# Patient Record
Sex: FEMALE | Race: WHITE | NOT HISPANIC OR LATINO | Employment: FULL TIME | URBAN - METROPOLITAN AREA
[De-identification: names, ages, dates, MRNs, and addresses within clinical notes are randomized per-mention and may not be internally consistent; named-entity substitution may affect disease eponyms.]

---

## 2017-04-20 ENCOUNTER — TELEPHONE (OUTPATIENT)
Dept: FAMILY MEDICINE | Facility: CLINIC | Age: 32
End: 2017-04-20

## 2017-04-20 NOTE — TELEPHONE ENCOUNTER
Panel Management Review      Patient has the following on her problem list: None      Composite cancer screening  Chart review shows that this patient is due/due soon for the following Pap Smear  Summary:    Patient is due/failing the following:   PAP    Action needed:   Patient needs office visit for pap.    Type of outreach:    Phone, spoke to patient.  agreed to schedule until she gets health insurance    Questions for provider review:    None                                                                                                                                    Yeny Chandra MA       Chart routed to Care Team .

## 2017-09-30 ENCOUNTER — OFFICE VISIT (OUTPATIENT)
Dept: URGENT CARE | Facility: URGENT CARE | Age: 32
End: 2017-09-30
Payer: MEDICAID

## 2017-09-30 VITALS
SYSTOLIC BLOOD PRESSURE: 115 MMHG | HEART RATE: 87 BPM | RESPIRATION RATE: 20 BRPM | DIASTOLIC BLOOD PRESSURE: 69 MMHG | TEMPERATURE: 98 F

## 2017-09-30 DIAGNOSIS — L03.012 CELLULITIS OF FINGER OF LEFT HAND: Primary | ICD-10-CM

## 2017-09-30 DIAGNOSIS — T63.301A SPIDER BITE, ACCIDENTAL OR UNINTENTIONAL, INITIAL ENCOUNTER: ICD-10-CM

## 2017-09-30 DIAGNOSIS — B37.31 YEAST INFECTION OF THE VAGINA: ICD-10-CM

## 2017-09-30 PROCEDURE — 87070 CULTURE OTHR SPECIMN AEROBIC: CPT | Performed by: NURSE PRACTITIONER

## 2017-09-30 PROCEDURE — 99214 OFFICE O/P EST MOD 30 MIN: CPT | Performed by: NURSE PRACTITIONER

## 2017-09-30 RX ORDER — DOXYCYCLINE 100 MG/1
100 CAPSULE ORAL 2 TIMES DAILY
Qty: 20 CAPSULE | Refills: 0 | Status: SHIPPED | OUTPATIENT
Start: 2017-09-30 | End: 2019-10-18

## 2017-09-30 RX ORDER — TRAMADOL HYDROCHLORIDE 50 MG/1
50 TABLET ORAL EVERY 6 HOURS PRN
Qty: 8 TABLET | Refills: 0 | Status: SHIPPED | OUTPATIENT
Start: 2017-09-30 | End: 2019-10-18

## 2017-09-30 RX ORDER — FLUCONAZOLE 150 MG/1
150 TABLET ORAL
Qty: 2 TABLET | Refills: 0 | Status: SHIPPED | OUTPATIENT
Start: 2017-09-30 | End: 2019-10-18

## 2017-09-30 NOTE — MR AVS SNAPSHOT
After Visit Summary   9/30/2017    Amisha Brown    MRN: [id number]           Patient Information     Date Of Birth          1985        Visit Information        Provider Department      9/30/2017 1:55 PM Ekta Fernandez APRN [facility]        Today's Diagnoses     Cellulitis of finger of left hand    -  1    Spider bite, accidental or unintentional, initial encounter          Care Instructions    Use medications as directed  Culture was sent today.  Warm heat to area. Elevate area.    Tylenol/Ibuprofen OTC as discussed for pain as long as no contraindications or allergies.     Return to clinic or follow up with PCP for recheck in 2-3  days.  To ER if any worsening symptoms at any time, you note the redness expanding outside the margins, red streaking, or fevers.     Patient voiced understanding of instructions given.     Discharge Instructions for Cellulitis  You have been diagnosed with cellulitis. This is an infection in the deepest layer of the skin. In some cases, the infection also affects the muscle. Cellulitis is caused by bacteria. The bacteria can enter the body through broken skin. This can happen with a cut, scratch, animal bite, or an insect bite that has been scratched. You may have been treated in the hospital with antibiotics and fluids. You will likely be given a prescription for antibiotics to take at home. This sheet will help you take care of yourself at home.  Home care  When you are home:    Take the prescribed antibiotic medicine you are given as directed until it is gone. Take it even if you feel better. It treats the infection and stops it from returning. Not taking all the medicine can make future infections hard to treat.    Keep the infected area clean.    When possible, raise the infected area above the level of your heart. This helps keep swelling down.    Talk with your healthcare provider if you are in pain. Ask what kind of  over-the-counter medicine you can take for pain.    Apply clean bandages as advised.    Take your temperature once a day for a week.    Wash your hands often to prevent spreading the infection.  In the future, wash your hands before and after you touch cuts, scratches, or bandages. This will help prevent infection.   When to call your healthcare provider  Call your healthcare provider immediately if you have any of the following:    Difficulty or pain when moving the joints above or below the infected area    Discharge or pus draining from the area    Fever of 100.4 F (38 C) or higher, or as directed by your healthcare provider    Pain that gets worse in or around the infected     Redness that gets worse in or around the infected area, particularly if the area of redness expands to a wider area    Shaking chills    Swelling of the infected area    Vomiting   Date Last Reviewed: 8/1/2016 2000-2017 [employer]. [address] [place] [address]. All rights reserved. This information is not intended as a substitute for professional medical care. Always follow your healthcare professional's instructions.        Cellulitis  Cellulitis is an infection of the deep layers of skin. A break in the skin, such as a cut or scratch, can let bacteria under the skin. If the bacteria get to deep layers of the skin, it can be serious. If not treated, cellulitis can get into the bloodstream and lymph nodes. The infection can then spread throughout the body. This causes serious illness.  Cellulitis causes the affected skin to become red, swollen, warm, and sore. The reddened areas have a visible border. An open sore may leak fluid (pus). You may have a fever, chills, and pain.  Cellulitis is treated with antibiotics taken for 7 to 10 days. An open sore may be cleaned and covered with cool wet gauze. Symptoms should get better 1 to 2 days after treatment is started. Make sure to take all the antibiotics for the  full number of days until they are gone. Keep taking the medicine even if your symptoms go away.  Home care  Follow these tips:    Limit the use of the part of your body with cellulitis.     If the infection is on your leg, keep your leg raised while sitting. This will help to reduce swelling.    Take all of the antibiotic medicine exactly as directed until it is gone. Do not miss any doses, especially during the first 7 days. Don t stop taking the medicine when your symptoms get better.    Keep the affected area clean and dry.    Wash your hands with soap and warm water before and after touching your skin. Anyone else who touches your skin should also wash his or her hands. Don't share towels.  Follow-up care  Follow up with your healthcare provider, or as advised. If your infection does not go away on the first antibiotic, your healthcare provider will prescribe a different one.  When to seek medical advice  Call your healthcare provider right away if any of these occur:    Red areas that spread    Swelling or pain that gets worse    Fluid leaking from the skin (pus)    Fever higher of 100.4  F (38.0  C) or higher after 2 days on antibiotics  Date Last Reviewed: 9/1/2016 2000-2017 [employer]. [address] [place] [address]. All rights reserved. This information is not intended as a substitute for professional medical care. Always follow your healthcare professional's instructions.        Brown Recluse Spider Bite    Most spiders are harmless to people. But there are 2 spiders in the [place] that can cause harm: the black  and the brown recluse.  Brown recluse spiders may be light or dark brown. They are   inch to   inch long, and have long, thin, legs. Some have a fiddle-shaped corazon on their back. They are most often found in the South, [place], and [place]. They live in places such as closets, attics, basements, porches, janette, and woodpiles.  A brown recluse spider bite may be  painless at first or may burn slightly like a bee sting. A small red corazon may form at the site. Then small blisters may appear. Within 2 to 8 hours, the area may become painful, swollen, and itchy. Other symptoms can include a general feeling of discomfort (malaise), fever, joint pain, nausea, and vomiting. In most cases, the bite site heals on its own within a week. But in some cases, the skin at the bite site may break down. (For instance, the skin may turn black and sink down as the tissue below dies.) This can cause an open wound called an ulcer to form. It may take a few weeks to a few months to heal. In rare cases, a brown recluse spider bite leads to more serious problems. These can include muscle damage, kidney failure, problems with blood clotting, and coma.  The bite site will be cleaned. Medicines may be given to relieve pain, if needed. Antibiotics may also be given to treat infection. A tetanus shot may be given. If an ulcer forms, skin grafting may be needed later to repair severe damage to the skin. But most bites heal without scarring.  Home care    You may use acetaminophen or ibuprofen to relieve pain, unless another pain medicine was prescribed. Note: Talk with your healthcare provider before using these medicines if you have chronic liver or kidney disease or ever had a stomach ulcer or GI bleeding.    If you were prescribed antibiotics, be sure to take them exactly as directed. Also be sure to complete the medicines.    Care for the bite site as directed by your healthcare provider. This may involve cleaning the wound at least once a day with soap and water.    To help ease pain and swelling, apply cold packs to the bite site as directed. You can use a cool wet washcloth. Or you can make a cold pack by filling a plastic bag that seals at the top with ice cubes and wrapping it with a thin towel. Do not apply ice directly on the skin.    If you were bitten on the arm or leg, keep the body part  "raised (elevated). Swelling may worsen in the down position.    Check the bite site daily for signs of infection (see below).  Follow-up care  Follow up with your healthcare provider, or as advised.  When to seek medical advice  Call your healthcare provider right away if any of these occur:    Fever of 100.4 F (38 C) or higher, or as directed by your provider    Pain worsens and is not relieved with medicine    Signs of infection at the bite site such as increased redness or streaking, swelling, or foul-smelling drainage    Bite site becomes black or blue    Bite site won t heal or grows larger  Date Last Reviewed: 3/1/2017    9445-9205 [employer]. [address] [place] [address], [place] [address]. All rights reserved. This information is not intended as a substitute for professional medical care. Always follow your healthcare professional's instructions.                Follow-ups after your visit        Who to contact     If you have questions or need follow up information about today's clinic visit or your schedule please contact [facility] directly at [phone].  Normal or non-critical lab and imaging results will be communicated to you by MD-IThart, letter or phone within 4 business days after the clinic has received the results. If you do not hear from us within 7 days, please contact the clinic through [employer] or phone. If you have a critical or abnormal lab result, we will notify you by phone as soon as possible.  Submit refill requests through [facility] or call your pharmacy and they will forward the refill request to us. Please allow 3 business days for your refill to be completed.          Additional Information About Your Visit        MD-ITharHepa Wash Information     [facility] lets you send messages to your doctor, view your test results, renew your prescriptions, schedule appointments and more. To sign up, go to [email or web address] . Click on \"Log in\" on the left side of " "the screen, which will take you to the Welcome page. Then click on \"Sign up Now\" on the right side of the page.     You will be asked to enter the access code listed below, as well as some personal information. Please follow the directions to create your username and password.     Your access code is: [email or web address]  Expires: 2017  2:39 PM     Your access code will  in 90 days. If you need help or a new code, please call your [facility] clinic or [phone].        Care EveryWhere ID     This is your Care EveryWhere ID. This could be used by other organizations to access your [facility] medical records  [id number]        Your Vitals Were     Pulse Temperature Respirations             87 98  F (36.7  C) (Tympanic) 20          Blood Pressure from Last 3 Encounters:   17 115/69   16 113/74   16 121/76    Weight from Last 3 Encounters:   16 188 lb (85.3 kg)   16 188 lb (85.3 kg)   09 170 lb (77.1 kg)              We Performed the Following     Wound Culture Aerobic Bacterial          Today's Medication Changes          These changes are accurate as of: 17  2:39 PM.  If you have any questions, ask your nurse or doctor.               Start taking these medicines.        Dose/Directions    doxycycline 100 MG capsule   Commonly known as:  VIBRAMYCIN   Used for:  Spider bite, accidental or unintentional, initial encounter, Cellulitis of finger of left hand   Started by:  Ekta Fernandez APRN CNP        Dose:  100 mg   Take 1 capsule (100 mg) by mouth 2 times daily   Quantity:  20 capsule   Refills:  0       traMADol 50 MG tablet   Commonly known as:  ULTRAM   Used for:  Spider bite, accidental or unintentional, initial encounter   Started by:  Ekta Fernandez APRN CNP        Dose:  50 mg   Take 1 tablet (50 mg) by mouth every 6 hours as needed for pain maximum 4 tablet(s) per day   Quantity:  8 tablet   Refills:  0            Where to get your medicines      [facility] " [facility] [facility] [place] [place] [facility] [address]  [address] [place] [address]    Hours:  [facility] [place] [facility] [employer] Phone:  [phone]     doxycycline 100 MG capsule         Some of these will need a paper prescription and others can be bought over the counter.  Ask your nurse if you have questions.     Bring a paper prescription for each of these medications     traMADol 50 MG tablet                Primary Care Provider Office Phone # Fax #    Sara Chase [phone]       [facility] [address] [facility] [address]        Equal Access to Services     [facility]: Hadii amrik Stephens, wakyle harrell, qaybta kaalmada jp, amarilys martinez. So [facility] [phone].    ATENCIÓN: Si habla español, tiene a bethea disposición servicios gratuitos de asistencia lingüística. [facility] [phone].    We comply with applicable federal civil rights laws and [place] laws. We do not discriminate on the basis of race, color, national origin, age, disability, sex, sexual orientation, or gender identity.            Thank you!     Thank you for choosing [facility]  for your care. Our goal is always to provide you with excellent care. Hearing back from our patients is one way we can continue to improve our services. Please take a few minutes to complete the written survey that you may receive in the mail after your visit with us. Thank you!             Your Updated Medication List - Protect others around you: Learn how to safely use, store and throw away your medicines at www.disposemymeds.org.          This list is accurate as of: 9/30/17  2:39 PM.  Always use your most recent med list.                   Brand Name Dispense Instructions for use Diagnosis    cyclobenzaprine 10 MG tablet    FLEXERIL    40 tablet    Take 1 tablet (10 mg) by mouth 3 times daily as needed for  muscle spasms    Sprain of neck, initial encounter       doxycycline 100 MG capsule    VIBRAMYCIN    20 capsule    Take 1 capsule (100 mg) by mouth 2 times daily    Spider bite, accidental or unintentional, initial encounter, Cellulitis of finger of left hand       HYDROcodone-acetaminophen 5-325 MG per tablet    NORCO    40 tablet    1-2 tabs Q 4 hours prn    Sprain of neck, initial encounter       IBUPROFEN PO      Take 200 mg by mouth every 4 hours as needed for moderate pain (pt taking 3 tabs every 3-4 hours)        predniSONE 20 MG tablet    DELTASONE    14 tablet    2 tabs QDAY x 7 days    Sprain of neck, initial encounter       traMADol 50 MG tablet    ULTRAM    8 tablet    Take 1 tablet (50 mg) by mouth every 6 hours as needed for pain maximum 4 tablet(s) per day    Spider bite, accidental or unintentional, initial encounter

## 2017-09-30 NOTE — PROGRESS NOTES
SUBJECTIVE:  Amisha Brown is a 32 year old female who presents to the clinic today for possible cellulitis on the left index finger.  Patient began noticing edema, skin erythema (reddened skin) and tenderness about 3  days ago. Denies fevers, chills, or night sweats.    Possible event related to current symptoms: bug bite  Last tetanus booster within 5 years: yes      History reviewed. No pertinent past medical history.     Current Outpatient Prescriptions   Medication Sig Dispense Refill     IBUPROFEN PO Take 200 mg by mouth every 4 hours as needed for moderate pain (pt taking 3 tabs every 3-4 hours)        HYDROcodone-acetaminophen (NORCO) 5-325 MG per tablet 1-2 tabs Q 4 hours prn (Patient not taking: Reported on 9/30/2017) 40 tablet 0     predniSONE (DELTASONE) 20 MG tablet 2 tabs QDAY x 7 days (Patient not taking: Reported on 9/30/2017) 14 tablet 0     cyclobenzaprine (FLEXERIL) 10 MG tablet Take 1 tablet (10 mg) by mouth 3 times daily as needed for muscle spasms (Patient not taking: Reported on 9/30/2017) 40 tablet 3       REVIEW OF SYSTEMS  ROS:   CONSTITUTIONAL:NEGATIVE for fever, chills, change in weight  INTEGUMENTARY/SKIN: See above   EYES: NEGATIVE for vision changes or irritation  ENT/MOUTH: NEGATIVE for ear, mouth and throat problems  RESP:NEGATIVE for significant cough or SOB  CV: NEGATIVE for chest pain, palpitations or peripheral edema  MUSCULOSKELETAL: NEGATIVE for significant arthralgias or myalgia  NEURO: NEGATIVE for weakness, dizziness or paresthesias    OBJECTIVE:  /69 (BP Location: Right arm, Cuff Size: Adult Regular)  Pulse 87  Temp 98  F (36.7  C) (Tympanic)  Resp 20   General appearance: alert,no apparent distress  CARDIAC:NORMAL - regular rate and rhythm without murmur.  RESP: Normal - CTA without rales, rhonchi, or wheezing.  ABDOMEN: Abdomen soft, non-tender. BS normal. No masses, organomegaly  SKIN:  Spider bite to left index finer with abrasion noted with 3 inches by 2  inches of redness        ASSESSMENT:    ICD-10-CM    1. Cellulitis of finger of left hand L03.012 Wound Culture Aerobic Bacterial     doxycycline (VIBRAMYCIN) 100 MG capsule   2. Spider bite, accidental or unintentional, initial encounter T63.301A doxycycline (VIBRAMYCIN) 100 MG capsule     traMADol (ULTRAM) 50 MG tablet   3. Yeast infection of the vagina B37.3 fluconazole (DIFLUCAN) 150 MG tablet           PLAN:  Patient Instructions     Use medications as directed  Culture was sent today.  Warm heat to area. Elevate area.    Tylenol/Ibuprofen OTC as discussed for pain as long as no contraindications or allergies.     Return to clinic or follow up with PCP for recheck in 2-3  days.  To ER if any worsening symptoms at any time, you note the redness expanding outside the margins, red streaking, or fevers.     Patient voiced understanding of instructions given.     Discharge Instructions for Cellulitis  You have been diagnosed with cellulitis. This is an infection in the deepest layer of the skin. In some cases, the infection also affects the muscle. Cellulitis is caused by bacteria. The bacteria can enter the body through broken skin. This can happen with a cut, scratch, animal bite, or an insect bite that has been scratched. You may have been treated in the hospital with antibiotics and fluids. You will likely be given a prescription for antibiotics to take at home. This sheet will help you take care of yourself at home.  Home care  When you are home:    Take the prescribed antibiotic medicine you are given as directed until it is gone. Take it even if you feel better. It treats the infection and stops it from returning. Not taking all the medicine can make future infections hard to treat.    Keep the infected area clean.    When possible, raise the infected area above the level of your heart. This helps keep swelling down.    Talk with your healthcare provider if you are in pain. Ask what kind of over-the-counter  medicine you can take for pain.    Apply clean bandages as advised.    Take your temperature once a day for a week.    Wash your hands often to prevent spreading the infection.  In the future, wash your hands before and after you touch cuts, scratches, or bandages. This will help prevent infection.   When to call your healthcare provider  Call your healthcare provider immediately if you have any of the following:    Difficulty or pain when moving the joints above or below the infected area    Discharge or pus draining from the area    Fever of 100.4 F (38 C) or higher, or as directed by your healthcare provider    Pain that gets worse in or around the infected     Redness that gets worse in or around the infected area, particularly if the area of redness expands to a wider area    Shaking chills    Swelling of the infected area    Vomiting   Date Last Reviewed: 8/1/2016 2000-2017 [employer]. [address] [place] [address]. All rights reserved. This information is not intended as a substitute for professional medical care. Always follow your healthcare professional's instructions.        Cellulitis  Cellulitis is an infection of the deep layers of skin. A break in the skin, such as a cut or scratch, can let bacteria under the skin. If the bacteria get to deep layers of the skin, it can be serious. If not treated, cellulitis can get into the bloodstream and lymph nodes. The infection can then spread throughout the body. This causes serious illness.  Cellulitis causes the affected skin to become red, swollen, warm, and sore. The reddened areas have a visible border. An open sore may leak fluid (pus). You may have a fever, chills, and pain.  Cellulitis is treated with antibiotics taken for 7 to 10 days. An open sore may be cleaned and covered with cool wet gauze. Symptoms should get better 1 to 2 days after treatment is started. Make sure to take all the antibiotics for the full number of  days until they are gone. Keep taking the medicine even if your symptoms go away.  Home care  Follow these tips:    Limit the use of the part of your body with cellulitis.     If the infection is on your leg, keep your leg raised while sitting. This will help to reduce swelling.    Take all of the antibiotic medicine exactly as directed until it is gone. Do not miss any doses, especially during the first 7 days. Don t stop taking the medicine when your symptoms get better.    Keep the affected area clean and dry.    Wash your hands with soap and warm water before and after touching your skin. Anyone else who touches your skin should also wash his or her hands. Don't share towels.  Follow-up care  Follow up with your healthcare provider, or as advised. If your infection does not go away on the first antibiotic, your healthcare provider will prescribe a different one.  When to seek medical advice  Call your healthcare provider right away if any of these occur:    Red areas that spread    Swelling or pain that gets worse    Fluid leaking from the skin (pus)    Fever higher of 100.4  F (38.0  C) or higher after 2 days on antibiotics  Date Last Reviewed: 9/1/2016 2000-2017 [employer]. [address] [place] [address]. All rights reserved. This information is not intended as a substitute for professional medical care. Always follow your healthcare professional's instructions.        Brown Recluse Spider Bite    Most spiders are harmless to people. But there are 2 spiders in the [place] that can cause harm: the black  and the brown recluse.  Brown recluse spiders may be light or dark brown. They are   inch to   inch long, and have long, thin, legs. Some have a fiddle-shaped corazon on their back. They are most often found in the [place], [place], and [place]. They live in places such as closets, attics, basements, porches, janette, and woodpiles.  A brown recluse spider bite may be painless at first or  may burn slightly like a bee sting. A small red corazon may form at the site. Then small blisters may appear. Within 2 to 8 hours, the area may become painful, swollen, and itchy. Other symptoms can include a general feeling of discomfort (malaise), fever, joint pain, nausea, and vomiting. In most cases, the bite site heals on its own within a week. But in some cases, the skin at the bite site may break down. (For instance, the skin may turn black and sink down as the tissue below dies.) This can cause an open wound called an ulcer to form. It may take a few weeks to a few months to heal. In rare cases, a brown recluse spider bite leads to more serious problems. These can include muscle damage, kidney failure, problems with blood clotting, and coma.  The bite site will be cleaned. Medicines may be given to relieve pain, if needed. Antibiotics may also be given to treat infection. A tetanus shot may be given. If an ulcer forms, skin grafting may be needed later to repair severe damage to the skin. But most bites heal without scarring.  Home care    You may use acetaminophen or ibuprofen to relieve pain, unless another pain medicine was prescribed. Note: Talk with your healthcare provider before using these medicines if you have chronic liver or kidney disease or ever had a stomach ulcer or GI bleeding.    If you were prescribed antibiotics, be sure to take them exactly as directed. Also be sure to complete the medicines.    Care for the bite site as directed by your healthcare provider. This may involve cleaning the wound at least once a day with soap and water.    To help ease pain and swelling, apply cold packs to the bite site as directed. You can use a cool wet washcloth. Or you can make a cold pack by filling a plastic bag that seals at the top with ice cubes and wrapping it with a thin towel. Do not apply ice directly on the skin.    If you were bitten on the arm or leg, keep the body part raised (elevated).  Swelling may worsen in the down position.    Check the bite site daily for signs of infection (see below).  Follow-up care  Follow up with your healthcare provider, or as advised.  When to seek medical advice  Call your healthcare provider right away if any of these occur:    Fever of 100.4 F (38 C) or higher, or as directed by your provider    Pain worsens and is not relieved with medicine    Signs of infection at the bite site such as increased redness or streaking, swelling, or foul-smelling drainage    Bite site becomes black or blue    Bite site won t heal or grows larger  Date Last Reviewed: 3/1/2017    3940-2825 [employer]. [address] [place] [address], [place] [address]. All rights reserved. This information is not intended as a substitute for professional medical care. Always follow your healthcare professional's instructions.            ALEENA Bueno CNP

## 2017-09-30 NOTE — NURSING NOTE
"Chief Complaint   Patient presents with     Insect Bites     Left ring finger. Thinks its a spider bite. Noticed Wed. has been documenting.  Line this morning drawn and getting worse. Painful.  can feel going up arm.  Ibuprofen and cream not helping       Initial /69 (BP Location: Right arm, Cuff Size: Adult Regular)  Pulse 87  Temp 98  F (36.7  C) (Tympanic)  Resp 20 Estimated body mass index is 32.27 kg/(m^2) as calculated from the following:    Height as of 12/28/16: 5' 4\" (1.626 m).    Weight as of 12/28/16: 188 lb (85.3 kg).  Medication Reconciliation: complete    Health Maintenance that is potentially due pending provider review:  NONE    n/a    Is there anyone who you would like to be able to receive your results? .  If yes have patient fill out JOYCE Peraza M.A.        "

## 2017-09-30 NOTE — PATIENT INSTRUCTIONS
Use medications as directed  Culture was sent today.  Warm heat to area. Elevate area.    Tylenol/Ibuprofen OTC as discussed for pain as long as no contraindications or allergies.     Return to clinic or follow up with PCP for recheck in 2-3  days.  To ER if any worsening symptoms at any time, you note the redness expanding outside the margins, red streaking, or fevers.     Patient voiced understanding of instructions given.     Discharge Instructions for Cellulitis  You have been diagnosed with cellulitis. This is an infection in the deepest layer of the skin. In some cases, the infection also affects the muscle. Cellulitis is caused by bacteria. The bacteria can enter the body through broken skin. This can happen with a cut, scratch, animal bite, or an insect bite that has been scratched. You may have been treated in the hospital with antibiotics and fluids. You will likely be given a prescription for antibiotics to take at home. This sheet will help you take care of yourself at home.  Home care  When you are home:    Take the prescribed antibiotic medicine you are given as directed until it is gone. Take it even if you feel better. It treats the infection and stops it from returning. Not taking all the medicine can make future infections hard to treat.    Keep the infected area clean.    When possible, raise the infected area above the level of your heart. This helps keep swelling down.    Talk with your healthcare provider if you are in pain. Ask what kind of over-the-counter medicine you can take for pain.    Apply clean bandages as advised.    Take your temperature once a day for a week.    Wash your hands often to prevent spreading the infection.  In the future, wash your hands before and after you touch cuts, scratches, or bandages. This will help prevent infection.   When to call your healthcare provider  Call your healthcare provider immediately if you have any of the following:    Difficulty or pain when  moving the joints above or below the infected area    Discharge or pus draining from the area    Fever of 100.4 F (38 C) or higher, or as directed by your healthcare provider    Pain that gets worse in or around the infected     Redness that gets worse in or around the infected area, particularly if the area of redness expands to a wider area    Shaking chills    Swelling of the infected area    Vomiting   Date Last Reviewed: 8/1/2016 2000-2017 [employer]. [address] [place] [address], [place] [address]. All rights reserved. This information is not intended as a substitute for professional medical care. Always follow your healthcare professional's instructions.        Cellulitis  Cellulitis is an infection of the deep layers of skin. A break in the skin, such as a cut or scratch, can let bacteria under the skin. If the bacteria get to deep layers of the skin, it can be serious. If not treated, cellulitis can get into the bloodstream and lymph nodes. The infection can then spread throughout the body. This causes serious illness.  Cellulitis causes the affected skin to become red, swollen, warm, and sore. The reddened areas have a visible border. An open sore may leak fluid (pus). You may have a fever, chills, and pain.  Cellulitis is treated with antibiotics taken for 7 to 10 days. An open sore may be cleaned and covered with cool wet gauze. Symptoms should get better 1 to 2 days after treatment is started. Make sure to take all the antibiotics for the full number of days until they are gone. Keep taking the medicine even if your symptoms go away.  Home care  Follow these tips:    Limit the use of the part of your body with cellulitis.     If the infection is on your leg, keep your leg raised while sitting. This will help to reduce swelling.    Take all of the antibiotic medicine exactly as directed until it is gone. Do not miss any doses, especially during the first 7 days. Don t stop taking the medicine  when your symptoms get better.    Keep the affected area clean and dry.    Wash your hands with soap and warm water before and after touching your skin. Anyone else who touches your skin should also wash his or her hands. Don't share towels.  Follow-up care  Follow up with your healthcare provider, or as advised. If your infection does not go away on the first antibiotic, your healthcare provider will prescribe a different one.  When to seek medical advice  Call your healthcare provider right away if any of these occur:    Red areas that spread    Swelling or pain that gets worse    Fluid leaking from the skin (pus)    Fever higher of 100.4  F (38.0  C) or higher after 2 days on antibiotics  Date Last Reviewed: 9/1/2016 2000-2017 [employer]. [address] [place] [address], [place] [address]. All rights reserved. This information is not intended as a substitute for professional medical care. Always follow your healthcare professional's instructions.        Brown Recluse Spider Bite    Most spiders are harmless to people. But there are 2 spiders in the [place] that can cause harm: the black  and the brown recluse.  Brown recluse spiders may be light or dark brown. They are   inch to   inch long, and have long, thin, legs. Some have a fiddle-shaped corazon on their back. They are most often found in the [place], [place], and [place]. They live in places such as closets, attics, basements, porches, janette, and woodpiles.  A brown recluse spider bite may be painless at first or may burn slightly like a bee sting. A small red corazon may form at the site. Then small blisters may appear. Within 2 to 8 hours, the area may become painful, swollen, and itchy. Other symptoms can include a general feeling of discomfort (malaise), fever, joint pain, nausea, and vomiting. In most cases, the bite site heals on its own within a week. But in some cases, the skin at the bite site may break down. (For instance, the skin may turn black  and sink down as the tissue below dies.) This can cause an open wound called an ulcer to form. It may take a few weeks to a few months to heal. In rare cases, a brown recluse spider bite leads to more serious problems. These can include muscle damage, kidney failure, problems with blood clotting, and coma.  The bite site will be cleaned. Medicines may be given to relieve pain, if needed. Antibiotics may also be given to treat infection. A tetanus shot may be given. If an ulcer forms, skin grafting may be needed later to repair severe damage to the skin. But most bites heal without scarring.  Home care    You may use acetaminophen or ibuprofen to relieve pain, unless another pain medicine was prescribed. Note: Talk with your healthcare provider before using these medicines if you have chronic liver or kidney disease or ever had a stomach ulcer or GI bleeding.    If you were prescribed antibiotics, be sure to take them exactly as directed. Also be sure to complete the medicines.    Care for the bite site as directed by your healthcare provider. This may involve cleaning the wound at least once a day with soap and water.    To help ease pain and swelling, apply cold packs to the bite site as directed. You can use a cool wet washcloth. Or you can make a cold pack by filling a plastic bag that seals at the top with ice cubes and wrapping it with a thin towel. Do not apply ice directly on the skin.    If you were bitten on the arm or leg, keep the body part raised (elevated). Swelling may worsen in the down position.    Check the bite site daily for signs of infection (see below).  Follow-up care  Follow up with your healthcare provider, or as advised.  When to seek medical advice  Call your healthcare provider right away if any of these occur:    Fever of 100.4 F (38 C) or higher, or as directed by your provider    Pain worsens and is not relieved with medicine    Signs of infection at the bite site such as increased  redness or streaking, swelling, or foul-smelling drainage    Bite site becomes black or blue    Bite site won t heal or grows larger  Date Last Reviewed: 3/1/2017    2359-9387 [employer]. [address] [place] [address], [place] [address]. All rights reserved. This information is not intended as a substitute for professional medical care. Always follow your healthcare professional's instructions.

## 2017-10-02 LAB
BACTERIA SPEC CULT: NO GROWTH
Lab: NORMAL
SPECIMEN SOURCE: NORMAL

## 2017-11-10 ENCOUNTER — TRANSFERRED RECORDS (OUTPATIENT)
Dept: HEALTH INFORMATION MANAGEMENT | Facility: CLINIC | Age: 32
End: 2017-11-10

## 2017-11-10 LAB
C TRACH DNA SPEC QL PROBE+SIG AMP: NEGATIVE
N GONORRHOEA DNA SPEC QL PROBE+SIG AMP: NEGATIVE
PAP-ABSTRACT: NORMAL
SPECIMEN DESCRIP: NORMAL
SPECIMEN DESCRIPTION: NORMAL

## 2018-03-12 ENCOUNTER — HOSPITAL ENCOUNTER (EMERGENCY)
Facility: CLINIC | Age: 33
Discharge: HOME | End: 2018-03-12
Attending: EMERGENCY MEDICINE | Admitting: EMERGENCY MEDICINE
Payer: COMMERCIAL

## 2018-03-12 VITALS
HEIGHT: 64 IN | WEIGHT: 175 LBS | BODY MASS INDEX: 29.88 KG/M2 | RESPIRATION RATE: 16 BRPM | OXYGEN SATURATION: 98 % | TEMPERATURE: 98.4 F | DIASTOLIC BLOOD PRESSURE: 84 MMHG | SYSTOLIC BLOOD PRESSURE: 138 MMHG

## 2018-03-12 DIAGNOSIS — H54.7 UNSPECIFIED VISUAL LOSS: ICD-10-CM

## 2018-03-12 DIAGNOSIS — H53.9 VISION CHANGES: ICD-10-CM

## 2018-03-12 PROCEDURE — 99283 EMERGENCY DEPT VISIT LOW MDM: CPT

## 2018-03-12 PROCEDURE — 99283 EMERGENCY DEPT VISIT LOW MDM: CPT | Mod: Z6 | Performed by: EMERGENCY MEDICINE

## 2018-03-12 ASSESSMENT — PAIN DESCRIPTION - DESCRIPTORS: DESCRIPTORS: ACHING

## 2018-03-12 ASSESSMENT — VISUAL ACUITY
OD: 20/30
OS: 20/25

## 2018-03-12 NOTE — ED NOTES
"Pt states for the last week pt has had loss of vision in her rt eye.  Pt states \"my vision has a spot of black in it as if I just looked at the sun for a while.\"  No ha.  No other symptoms or recent illness.  Symptoms for past week but sates it is getting worse.  "

## 2018-03-12 NOTE — ED AVS SNAPSHOT
[facility] Emergency Department    [address] [facility] [address]    Phone:  [phone]    Fax:  [phone]                                       Amisha Smith   MRN: [id number]    Department:  [facility] Emergency Department   Date of Visit:  3/12/2018           After Visit Summary Signature Page     I have received my discharge instructions, and my questions have been answered. I have discussed any challenges I see with this plan with the nurse or doctor.    ..........................................................................................................................................  Patient/Patient Representative Signature      ..........................................................................................................................................  Patient Representative Print Name and Relationship to Patient    ..................................................               ................................................  Date                                            Time    ..........................................................................................................................................  Reviewed by Signature/Title    ...................................................              ..............................................  Date                                                            Time

## 2018-03-12 NOTE — ED PROVIDER NOTES
"  History     Chief Complaint   Patient presents with     Loss of Vision     Rt eye / middle of eye vision REt eye, peripheral OK. / 1 weekj     HPI  Amisha Smith is a 33 year old female who presents with right visual changes. She reports that 1 week ago she began to have some blurriness of vision in her right eye. Then 3-4 days ago she began to have \"missing spots\" in her vision. She feels that her visual changes are more centrally located in her visual field. She describes reading a text message and not seeing some of the words unless she moves the phone or turns her head. No pressure or pain in her right eye. No headache, fevers, dizziness or balance difficulties. She normally wears glasses or contacts. She has a prescription for her left eye, but her right eye has always been 20/20. She is a former smoker.     Problem List:    There are no active problems to display for this patient.       Past Medical History:    No past medical history on file.   Insomnia    Past Surgical History:    No past surgical history on file.    Family History:    Family History   Problem Relation Age of Onset     Eye Disorder Maternal Grandfather      CANCER Maternal Grandfather        Social History:  Marital Status:  Single [1]  Social History   Substance Use Topics     Smoking status: Former Smoker     Types: Cigarettes     Smokeless tobacco: Never Used      Comment: quit x 1 week     Alcohol use Yes      Comment: rare        Medications:      doxycycline (VIBRAMYCIN) 100 MG capsule   traMADol (ULTRAM) 50 MG tablet   fluconazole (DIFLUCAN) 150 MG tablet   IBUPROFEN PO   HYDROcodone-acetaminophen (NORCO) 5-325 MG per tablet   predniSONE (DELTASONE) 20 MG tablet   cyclobenzaprine (FLEXERIL) 10 MG tablet         Review of Systems   Constitutional: No fever, chills, malaise, weakness  Eyes: Vision changes as per HPI.   ENT: No change in hearing, ear pain, sore throat, drainage  Respiratory: No shortness of breath, or " "cough  Cardiovascular: No chest pain, syncope  Gastrointestinal: No nausea, vomiting, heartburn, diarrhea, constipation, abdominal pain  Musculoskeletal: No arthralgias, myalgias, muscle weakness  Hematologic: No bruising   Neurologic: No headache, dizziness, focal weakness, sensory loss, paresthesias, seizure, tremor, falls      Physical Exam   BP: 143/89  Heart Rate: 84  Temp: 98.4  F (36.9  C)  Resp: 16  Height: 162.6 cm (5' 4\")  Weight: 79.4 kg (175 lb)  SpO2: 98 %  Visual Acuity-Left: 20/25  Visual Acuity-Right: 20/30      Physical Exam   Constitutional: well appearing well nourished, in no distress, ambulating without difficulty  HEENT: normocephalic, atraumatic, normal external eyes, conjunctiva clear, sclera anicteric, EOM intact, PERRLA, fundi have normal optic discs and vessels, no exudates or hemorrhages. TMs translucent and normal landmarks visualized. Moist mucous membranes, no mucosal lesions. Mucosa non-inflamed, no tonsillar hypertrophy or exudate. No adenopathy; thyroid non-enlarged and non-tender  Cardiovascular: regular rate and rhythm, normal S1 and S2, no rubs, murmurs or gallops, peripheral pulses strong and equal bilaterally, no peripheral edema  Respiratory: lung sounds clear to auscultation and equal bilaterally, no wheezes, crackles, or rhonchi  GI: soft, non-distended abdomen, non-tender to palpation  Skin: warm and dry  Musculoskeletal: ROM intact of spine and extremities  Neuro: alert and oriented to person, time, and place, intact recent and remote memory, CN II-XII grossly intact  Psych: appropriate mood and affect, judgement and insight, cooperative with exam      ED Course     ED Course     Procedures               Critical Care time:  none               No results found for this or any previous visit (from the past 24 hour(s)).    Medications - No data to display    Assessments & Plan (with Medical Decision Making)   Amisha Smith is a 33 year old female who presents with right " visual changes for one week. Low concern for temporal arteritis given age and complaint. Less likely migraine induced given no headache, pressure or tenderness behind the eye, and length of symptoms. Could be a vascular cause.  Patient reviewed with Dr. Jordan, [facility].  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    I have reviewed the nursing notes.    I have reviewed the findings, diagnosis, plan and need for follow up with the patient.       Discharge Medication List as of 3/12/2018 12:18 PM      I interviewed and examined the patient, supervised workup, discussed differential diagnoses, reviewed results of studies, agree with assessment, plan and contents of note.  Dr. Ronald Gutierrez        Final diagnoses:   Vision changes       Alex Carlosashutosh, MS3    3/12/2018   [facility] EMERGENCY DEPARTMENT     Ronald Gutierrez MD  03/12/18 1602       Ronald Gutierrez MD  03/12/18 160

## 2018-03-12 NOTE — ED AVS SNAPSHOT
[facility] Emergency Department    [address] [address]    Phone:  [phone]    Fax:  [phone]                                       Amisha Smith   MRN: [id number]    Department:  [facility] Emergency Department   Date of Visit:  3/12/2018           Patient Information     Date Of Birth          1985        Your diagnoses for this visit were:     Vision changes        You were seen by Ronald Gutierrez MD.        Discharge Instructions       Go directly to [facility] clinic.    24 Hour Appointment Hotline       To make an appointment at any [facility], call [phone] ([phone]). If you don't have a family doctor or clinic, we will help you find one. [place] clinics are conveniently located to serve the needs of you and your family.             Review of your medicines      Our records show that you are taking the medicines listed below. If these are incorrect, please call your family doctor or clinic.        Dose / Directions Last dose taken    cyclobenzaprine 10 MG tablet   Commonly known as:  FLEXERIL   Dose:  10 mg   Quantity:  40 tablet        Take 1 tablet (10 mg) by mouth 3 times daily as needed for muscle spasms   Refills:  3        doxycycline 100 MG capsule   Commonly known as:  VIBRAMYCIN   Dose:  100 mg   Quantity:  20 capsule        Take 1 capsule (100 mg) by mouth 2 times daily   Refills:  0        fluconazole 150 MG tablet   Commonly known as:  DIFLUCAN   Dose:  150 mg   Quantity:  2 tablet        Take 1 tablet (150 mg) by mouth every 3 days   Refills:  0        HYDROcodone-acetaminophen 5-325 MG per tablet   Commonly known as:  NORCO   Quantity:  40 tablet        1-2 tabs Q 4 hours prn   Refills:  0        IBUPROFEN PO   Dose:  200 mg        Take 200 mg by mouth every 4 hours as needed for moderate pain (pt taking 3 tabs every 3-4 hours)   Refills:  0        predniSONE 20 MG tablet   Commonly known as:  DELTASONE   Quantity:  14 tablet  "       2 tabs QDAY x 7 days   Refills:  0        traMADol 50 MG tablet   Commonly known as:  ULTRAM   Dose:  50 mg   Quantity:  8 tablet        Take 1 tablet (50 mg) by mouth every 6 hours as needed for pain maximum 4 tablet(s) per day   Refills:  0                Orders Needing Specimen Collection     None      Pending Results     No orders found from 3/10/2018 to 3/13/2018.            Pending Culture Results     No orders found from 3/10/2018 to 3/13/2018.            Pending Results Instructions     If you had any lab results that were not finalized at the time of your Discharge, you can call the ED Lab Result RN at [phone]. You will be contacted by this team for any positive Lab results or changes in treatment. The nurses are available 7 days a week from 10A to 6:30P.  You can leave a message 24 hours per day and they will return your call.        Test Results From Your Hospital Stay               Thank you for choosing [facility]       Thank you for choosing [facility] for your care. Our goal is always to provide you with excellent care. Hearing back from our patients is one way we can continue to improve our services. Please take a few minutes to complete the written survey that you may receive in the mail after you visit with us. Thank you!        [employer] Information     Smart Panel lets you send messages to your doctor, view your test results, renew your prescriptions, schedule appointments and more. To sign up, go to [email or web address] . Click on \"Log in\" on the left side of the screen, which will take you to the Welcome page. Then click on \"Sign up Now\" on the right side of the page.     You will be asked to enter the access code listed below, as well as some personal information. Please follow the directions to create your username and password.     Your access code is: [email or web address]  Expires: 6/10/2018 12:18 PM     Your access code will  in 90 days. If you need help or a new code, please call your " [facility] or [phone].        Care EveryWhere ID     This is your Care EveryWhere ID. This could be used by other organizations to access your [facility] medical records  [id number]        Equal Access to Services     MERRILL BUSTAMANTE : Lucille Stephens, waaxda luqadaha, qaybta kaalmada jp, amarilys martinez. So [facility] [phone].    ATENCIÓN: Si habla español, tiene a bethea disposición servicios gratuitos de asistencia lingüística. Llame al [phone].    We comply with applicable federal civil rights laws and [place] laws. We do not discriminate on the basis of race, color, national origin, age, disability, sex, sexual orientation, or gender identity.            After Visit Summary       This is your record. Keep this with you and show to your community pharmacist(s) and doctor(s) at your next visit.

## 2018-04-13 ENCOUNTER — OFFICE VISIT (OUTPATIENT)
Dept: OBGYN | Facility: CLINIC | Age: 33
End: 2018-04-13
Payer: COMMERCIAL

## 2018-04-13 ENCOUNTER — TELEPHONE (OUTPATIENT)
Dept: OBGYN | Facility: CLINIC | Age: 33
End: 2018-04-13

## 2018-04-13 VITALS
BODY MASS INDEX: 30.25 KG/M2 | WEIGHT: 177.2 LBS | DIASTOLIC BLOOD PRESSURE: 72 MMHG | HEIGHT: 64 IN | SYSTOLIC BLOOD PRESSURE: 117 MMHG | HEART RATE: 82 BPM | RESPIRATION RATE: 16 BRPM | TEMPERATURE: 97.9 F

## 2018-04-13 DIAGNOSIS — Z30.430 ENCOUNTER FOR INSERTION OF MIRENA IUD: ICD-10-CM

## 2018-04-13 DIAGNOSIS — T83.32XA INTRAUTERINE CONTRACEPTIVE DEVICE THREADS LOST, INITIAL ENCOUNTER: ICD-10-CM

## 2018-04-13 DIAGNOSIS — Z30.433 ENCOUNTER FOR REMOVAL AND REINSERTION OF INTRAUTERINE CONTRACEPTIVE DEVICE (IUD): Primary | ICD-10-CM

## 2018-04-13 PROCEDURE — 58300 INSERT INTRAUTERINE DEVICE: CPT | Performed by: OBSTETRICS & GYNECOLOGY

## 2018-04-13 PROCEDURE — 76830 TRANSVAGINAL US NON-OB: CPT | Performed by: OBSTETRICS & GYNECOLOGY

## 2018-04-13 PROCEDURE — 58301 REMOVE INTRAUTERINE DEVICE: CPT | Performed by: OBSTETRICS & GYNECOLOGY

## 2018-04-13 NOTE — PROGRESS NOTES
33 year old Amisha Smith is a 33 year old  who presents today requesting re placement of an Mirena iud.she has no cycles on it.  She wants it replaced.  Her primary was unable to locate the strings but an xray showed its presence.    The patient meets and is agreeable to the following conditions:  She is not interested in conception in the near future.   She currently is in a stable, monogamous relationship.   There is no previous history of pelvic inflammatory disease.   There is no previous history of ectopic pregnancy.   She is willing to check monthly for the IUD string.   She is at least 8 weeks post-partum.   There is no history of unresolved abnormal uterine bleeding.   There is no history of an unresolved abnormal PAP smear.   She has no history of Shady's disease or an allergy to copper (for Paraguard).   She has no history of diabetes, AIDS, leukemia, IV drug use or chronic steroid use.   She is willing to return annually for PAP smears.   She has had a PAP smear within the past 6 months.   She has had negative GC/Chlamydia testing within the past month.   She denies the possibility of pregnancy.   Pregnancy test today is negative.     We discussed risks, benefits, and alternatives including but not limited to:   Possibility of pregnancy and ectopic pregnancy.  Possibility of pelvic inflammatory disease, particularly with new partners.  Risk of uterine perforation or IUD expulsion.  Possibility of difficult removal.  Spotting or heavy bleeding.  Cramping, pain or infection during or after insertion.    The patient was given patient information on the IUD and the patient education brochure from the .  The patient has given consent to proceed with placement of the IUD.  She wishes to proceed.  All questions answered.    Transvaginal portable pelvic US done-confirmed intrauterine position of present IUD, removed  Post replacement US repeated which confirmed appropriate intrauterine  position    PROCEDURE:    Type of IUD: Mirena    The patient has taken 600-800mg of Ibuprofen prior to the procedure.   She is placed in a dorsal lithotomy potion and a pelvic exam is performed to determine the position of the uterus.  The cervix is identified and cleaned with betadine.  A single tooth tenaculum is applied to the anterior lip of the cervix for stabilization.   The uterus sounded to 8.0 cm. (Target sound depth is 6.5 cm to 8.5 cm.)  The IUD insertion tube is prepared to manufacturers recommendations and inserted into the uterus under sterile conditions in the usual fashion.  The IUD string is then cut to 2.0 cm.    The patient tolerated this procedure without immediate complication.  The patient is to return or call immediately for any unexplained fever, abdominal or pelvic pain, excessive bleeding, possibility of pregnancy, foul-smelling discharge, sense that the IUD has been expelled.  All questions were answered.    Return to clinic in 1 month for IUD check if needed    Earlene Medel MD

## 2018-04-13 NOTE — MR AVS SNAPSHOT
"              After Visit Summary   2018    Amisha Smith    MRN: [id number]           Patient Information     Date Of Birth          1985        Visit Information        Provider Department      2018 9:00 AM Earlene Medel MD [facility]        Today's Diagnoses     Encounter for removal and reinsertion of intrauterine contraceptive device (IUD)    -  1    Mirena IUD- Placed 18        Intrauterine contraceptive device threads lost, initial encounter           Follow-ups after your visit        Who to contact     If you have questions or need follow up information about today's clinic visit or your schedule please contact [facility] directly at [phone].  Normal or non-critical lab and imaging results will be communicated to you by MyChart, letter or phone within 4 business days after the clinic has received the results. If you do not hear from us within 7 days, please contact the clinic through [employer] or phone. If you have a critical or abnormal lab result, we will notify you by phone as soon as possible.  Submit refill requests through [employer] or call your pharmacy and they will forward the refill request to us. Please allow 3 business days for your refill to be completed.          Additional Information About Your Visit        MyChart Information     [employer] lets you send messages to your doctor, view your test results, renew your prescriptions, schedule appointments and more. To sign up, go to [email or web address] . Click on \"Log in\" on the left side of the screen, which will take you to the Welcome page. Then click on \"Sign up Now\" on the right side of the page.     You will be asked to enter the access code listed below, as well as some personal information. Please follow the directions to create your username and password.     Your access code is: [email or web address]  Expires: 6/10/2018 12:18 PM     Your access code will  in 90 days. If " "you need help or a new code, please call your [facility]3] or [phone].        Care EveryWhere ID     This is your Care EveryWhere ID. This could be used by other organizations to access your [facility] medical records  [id number]        Your Vitals Were     Pulse Temperature Respirations Height BMI (Body Mass Index)       82 97.9  F (36.6  C) (Tympanic) 16 5' 4\" (1.626 m) 30.42 kg/m2        Blood Pressure from Last 3 Encounters:   04/13/18 117/72   03/12/18 138/84   09/30/17 115/69    Weight from Last 3 Encounters:   04/13/18 177 lb 3.2 oz (80.4 kg)   03/12/18 175 lb (79.4 kg)   12/28/16 188 lb (85.3 kg)              We Performed the Following     HC LEVONORGESTREL IU 52MG 5 YR     INSERTION INTRAUTERINE DEVICE     REMOVE INTRAUTERINE DEVICE     US Pelvic Limited Portable          Today's Medication Changes          These changes are accurate as of 4/13/18 12:19 PM.  If you have any questions, ask your nurse or doctor.               Start taking these medicines.        Dose/Directions    levonorgestrel 20 MCG/24HR IUD   Commonly known as:  MIRENA   Used for:  Encounter for insertion of mirena IUD, Encounter for removal and reinsertion of intrauterine contraceptive device (IUD)   Started by:  Earlene Medel MD        Dose:  1 each   1 each (20 mcg) by Intrauterine route continuous   Refills:  0            Where to get your medicines      Some of these will need a paper prescription and others can be bought over the counter.  Ask your nurse if you have questions.     You don't need a prescription for these medications     levonorgestrel 20 MCG/24HR IUD                Primary Care Provider Office Phone # Fax #    Sara Chase [phone]       [facility] [address] [address]        Equal Access to Services     MERRILL BUSTAMANTE AH: Lucille Stephens, waaxda luqadaha, qaybta kaalmada adecleopatrayamarilee, amarilys martinez. So wa " [phone].    ATENCIÓN: Si luciana hale, tiene a bethea disposición servicios gratuitos de asistencia lingüística. Aleksandr duval [phone].    We comply with applicable federal civil rights laws and [place] laws. We do not discriminate on the basis of race, color, national origin, age, disability, sex, sexual orientation, or gender identity.            Thank you!     Thank you for choosing [facility]  for your care. Our goal is always to provide you with excellent care. Hearing back from our patients is one way we can continue to improve our services. Please take a few minutes to complete the written survey that you may receive in the mail after your visit with us. Thank you!             Your Updated Medication List - Protect others around you: Learn how to safely use, store and throw away your medicines at www.disposemymeds.org.          This list is accurate as of 4/13/18 12:19 PM.  Always use your most recent med list.                   Brand Name Dispense Instructions for use Diagnosis    cyclobenzaprine 10 MG tablet    FLEXERIL    40 tablet    Take 1 tablet (10 mg) by mouth 3 times daily as needed for muscle spasms    Sprain of neck, initial encounter       doxycycline 100 MG capsule    VIBRAMYCIN    20 capsule    Take 1 capsule (100 mg) by mouth 2 times daily    Spider bite, accidental or unintentional, initial encounter, Cellulitis of finger of left hand       fluconazole 150 MG tablet    DIFLUCAN    2 tablet    Take 1 tablet (150 mg) by mouth every 3 days    Yeast infection of the vagina       HYDROcodone-acetaminophen 5-325 MG per tablet    NORCO    40 tablet    1-2 tabs Q 4 hours prn    Sprain of neck, initial encounter       IBUPROFEN PO      Take 200 mg by mouth every 4 hours as needed for moderate pain (pt taking 3 tabs every 3-4 hours)        levonorgestrel 20 MCG/24HR IUD    MIRENA     1 each (20 mcg) by Intrauterine route continuous    Encounter for insertion of mirena IUD, Encounter  for removal and reinsertion of intrauterine contraceptive device (IUD)       predniSONE 20 MG tablet    DELTASONE    14 tablet    2 tabs QDAY x 7 days    Sprain of neck, initial encounter       traMADol 50 MG tablet    ULTRAM    8 tablet    Take 1 tablet (50 mg) by mouth every 6 hours as needed for pain maximum 4 tablet(s) per day    Spider bite, accidental or unintentional, initial encounter

## 2019-10-18 ENCOUNTER — OFFICE VISIT (OUTPATIENT)
Dept: FAMILY MEDICINE | Facility: CLINIC | Age: 34
End: 2019-10-18
Payer: COMMERCIAL

## 2019-10-18 VITALS
DIASTOLIC BLOOD PRESSURE: 66 MMHG | SYSTOLIC BLOOD PRESSURE: 110 MMHG | HEIGHT: 64 IN | WEIGHT: 194 LBS | BODY MASS INDEX: 33.12 KG/M2 | HEART RATE: 80 BPM | TEMPERATURE: 97.7 F | RESPIRATION RATE: 16 BRPM

## 2019-10-18 DIAGNOSIS — L98.9 SKIN LESION: ICD-10-CM

## 2019-10-18 DIAGNOSIS — L65.9 HAIR LOSS: ICD-10-CM

## 2019-10-18 DIAGNOSIS — G47.00 INSOMNIA, UNSPECIFIED TYPE: ICD-10-CM

## 2019-10-18 DIAGNOSIS — Z00.00 ROUTINE GENERAL MEDICAL EXAMINATION AT A HEALTH CARE FACILITY: Primary | ICD-10-CM

## 2019-10-18 LAB
BASOPHILS # BLD AUTO: 0 10E9/L (ref 0–0.2)
BASOPHILS NFR BLD AUTO: 0.3 %
DIFFERENTIAL METHOD BLD: ABNORMAL
EOSINOPHIL # BLD AUTO: 0.2 10E9/L (ref 0–0.7)
EOSINOPHIL NFR BLD AUTO: 3.8 %
ERYTHROCYTE [DISTWIDTH] IN BLOOD BY AUTOMATED COUNT: 11.9 % (ref 10–15)
HCT VFR BLD AUTO: 41.1 % (ref 35–47)
HGB BLD-MCNC: 13.8 G/DL (ref 11.7–15.7)
LYMPHOCYTES # BLD AUTO: 1.2 10E9/L (ref 0.8–5.3)
LYMPHOCYTES NFR BLD AUTO: 18.5 %
MCH RBC QN AUTO: 34 PG (ref 26.5–33)
MCHC RBC AUTO-ENTMCNC: 33.6 G/DL (ref 31.5–36.5)
MCV RBC AUTO: 101 FL (ref 78–100)
MONOCYTES # BLD AUTO: 0.5 10E9/L (ref 0–1.3)
MONOCYTES NFR BLD AUTO: 8.4 %
NEUTROPHILS # BLD AUTO: 4.3 10E9/L (ref 1.6–8.3)
NEUTROPHILS NFR BLD AUTO: 69 %
PLATELET # BLD AUTO: 245 10E9/L (ref 150–450)
RBC # BLD AUTO: 4.06 10E12/L (ref 3.8–5.2)
TSH SERPL DL<=0.005 MIU/L-ACNC: 0.6 MU/L (ref 0.4–4)
WBC # BLD AUTO: 6.3 10E9/L (ref 4–11)

## 2019-10-18 PROCEDURE — 99395 PREV VISIT EST AGE 18-39: CPT | Performed by: NURSE PRACTITIONER

## 2019-10-18 PROCEDURE — 87491 CHLMYD TRACH DNA AMP PROBE: CPT | Performed by: NURSE PRACTITIONER

## 2019-10-18 PROCEDURE — 87591 N.GONORRHOEAE DNA AMP PROB: CPT | Performed by: NURSE PRACTITIONER

## 2019-10-18 PROCEDURE — 36415 COLL VENOUS BLD VENIPUNCTURE: CPT | Performed by: NURSE PRACTITIONER

## 2019-10-18 PROCEDURE — 99213 OFFICE O/P EST LOW 20 MIN: CPT | Mod: 25 | Performed by: NURSE PRACTITIONER

## 2019-10-18 PROCEDURE — 84443 ASSAY THYROID STIM HORMONE: CPT | Performed by: NURSE PRACTITIONER

## 2019-10-18 PROCEDURE — 85025 COMPLETE CBC W/AUTO DIFF WBC: CPT | Performed by: NURSE PRACTITIONER

## 2019-10-18 RX ORDER — TRAZODONE HYDROCHLORIDE 100 MG/1
50 TABLET ORAL
COMMUNITY
Start: 2019-01-14 | End: 2020-09-15

## 2019-10-18 RX ORDER — TRAZODONE HYDROCHLORIDE 150 MG/1
TABLET ORAL
Qty: 135 TABLET | Refills: 3 | Status: SHIPPED | OUTPATIENT
Start: 2019-10-18 | End: 2020-09-15

## 2019-10-18 RX ORDER — TRAZODONE HYDROCHLORIDE 100 MG/1
50 TABLET ORAL AT BEDTIME
Status: CANCELLED | OUTPATIENT
Start: 2019-10-18

## 2019-10-18 ASSESSMENT — ENCOUNTER SYMPTOMS
PARESTHESIAS: 0
JOINT SWELLING: 0
EYE PAIN: 0
CONSTIPATION: 0
DIZZINESS: 0
FEVER: 0
NAUSEA: 0
CHILLS: 0
ABDOMINAL PAIN: 0
PALPITATIONS: 0
DYSURIA: 0
DIARRHEA: 0
HEMATOCHEZIA: 0
COUGH: 0
HEARTBURN: 0
HEMATURIA: 0
WEAKNESS: 0
NERVOUS/ANXIOUS: 0
SHORTNESS OF BREATH: 0
HEADACHES: 0
FREQUENCY: 0
BREAST MASS: 0
SORE THROAT: 0
MYALGIAS: 0
ARTHRALGIAS: 0

## 2019-10-18 ASSESSMENT — MIFFLIN-ST. JEOR: SCORE: 1559.98

## 2019-10-18 NOTE — PROGRESS NOTES
SUBJECTIVE:   CC: Amisha Smith is an 34 year old woman who presents for preventive health visit.     Healthy Habits:     Getting at least 3 servings of Calcium per day:  Yes    Bi-annual eye exam:  Yes    Dental care twice a year:  Yes    Sleep apnea or symptoms of sleep apnea:  None    Diet:  Regular (no restrictions)    Frequency of exercise:  2-3 days/week    Duration of exercise:  15-30 minutes    Taking medications regularly:  Yes    Medication side effects:  Not applicable    PHQ-2 Total Score: 0    Additional concerns today:  No    Insomnia      Duration: 20 Years     Description  Frequency of insomnia:  Nightly - without medication   Time to fall asleep: 20 min with medication     Accompanying signs and symptoms:  none    History  Similar episodes in past:  YES  Previous evaluation/sleep study:  no     Precipitating or alleviating factors:  New stressful situation: no   Caffeine intake after lunchtime: no   OTC decongestants: no   Any new medications: no     Therapies tried and outcome: Trazodone 225mg  total     STD - requesting to be checked. Denies any symptoms       Today's PHQ-2 Score:   PHQ-2 ( 1999 Pfizer) 10/18/2019   Q1: Little interest or pleasure in doing things 0   Q2: Feeling down, depressed or hopeless 0   PHQ-2 Score 0   Q1: Little interest or pleasure in doing things Not at all   Q2: Feeling down, depressed or hopeless Not at all   PHQ-2 Score 0       Abuse: Current or Past(Physical, Sexual or Emotional)- No  Do you feel safe in your environment? Yes    Social History     Tobacco Use     Smoking status: Former Smoker     Packs/day: 0.00     Types: Cigarettes     Smokeless tobacco: Never Used   Substance Use Topics     Alcohol use: Yes     Comment: rare         Alcohol Use 10/18/2019   Prescreen: >3 drinks/day or >7 drinks/week? No       Reviewed orders with patient.  Reviewed health maintenance and updated orders accordingly - Yes  Labs reviewed in EPIC  BP Readings from Last 3  Encounters:   10/18/19 110/66   18 117/72   18 138/84    Wt Readings from Last 3 Encounters:   10/18/19 88 kg (194 lb)   18 80.4 kg (177 lb 3.2 oz)   18 79.4 kg (175 lb)                  Patient Active Problem List   Diagnosis     Mirena IUD- Placed 18     History reviewed. No pertinent surgical history.    Social History     Tobacco Use     Smoking status: Former Smoker     Packs/day: 0.00     Types: Cigarettes     Smokeless tobacco: Never Used   Substance Use Topics     Alcohol use: Yes     Comment: rare     Family History   Problem Relation Age of Onset     Eye Disorder Maternal Grandfather      Cancer Maternal Grandfather          Current Outpatient Medications   Medication Sig Dispense Refill     levonorgestrel (MIRENA) 20 MCG/24HR IUD 1 each (20 mcg) by Intrauterine route continuous       traZODone (DESYREL) 100 MG tablet Take 50 mg by mouth 225mg total       No Known Allergies  No lab results found.     Mammogram Screening: Patient under age 50, mutual decision reflected in health maintenance.      Pertinent mammograms are reviewed under the imaging tab.  History of abnormal Pap smear: NO - age 30- 65 PAP every 3 years recommended     Reviewed and updated as needed this visit by clinical staff  Tobacco  Allergies  Meds  Med Hx  Surg Hx  Fam Hx  Soc Hx        Reviewed and updated as needed this visit by Provider        History reviewed. No pertinent past medical history.   History reviewed. No pertinent surgical history.  OB History    Para Term  AB Living   2 0 0 0 0 2   SAB TAB Ectopic Multiple Live Births   0 0 0 0 2      # Outcome Date GA Lbr Charbel/2nd Weight Sex Delivery Anes PTL Lv   2             1                 Review of Systems   Constitutional: Negative for chills and fever.   HENT: Negative for congestion, ear pain, hearing loss and sore throat.    Eyes: Negative for pain and visual disturbance.   Respiratory: Negative for cough  "and shortness of breath.    Cardiovascular: Negative for chest pain, palpitations and peripheral edema.   Gastrointestinal: Negative for abdominal pain, constipation, diarrhea, heartburn, hematochezia and nausea.   Breasts:  Negative for tenderness, breast mass and discharge.   Genitourinary: Negative for dysuria, frequency, genital sores, hematuria, pelvic pain, urgency, vaginal bleeding and vaginal discharge.   Musculoskeletal: Negative for arthralgias, joint swelling and myalgias.   Skin: Negative for rash.   Neurological: Negative for dizziness, weakness, headaches and paresthesias.   Psychiatric/Behavioral: Negative for mood changes. The patient is not nervous/anxious.           OBJECTIVE:   /66   Pulse 80   Temp 97.7  F (36.5  C) (Tympanic)   Resp 16   Ht 1.626 m (5' 4\")   Wt 88 kg (194 lb)   BMI 33.30 kg/m    Physical Exam  GENERAL: healthy, alert and no distress  EYES: Eyes grossly normal to inspection, PERRL and conjunctivae and sclerae normal  HENT: ear canals and TM's normal, nose and mouth without ulcers or lesions  NECK: no adenopathy, no asymmetry, masses, or scars and thyroid normal to palpation  RESP: lungs clear to auscultation - no rales, rhonchi or wheezes  BREAST: normal without masses, tenderness or nipple discharge and no palpable axillary masses or adenopathy  CV: regular rate and rhythm, normal S1 S2, no S3 or S4, no murmur, click or rub, no peripheral edema and peripheral pulses strong  ABDOMEN: soft, nontender, no hepatosplenomegaly, no masses and bowel sounds normal  MS: no gross musculoskeletal defects noted, no edema  SKIN: no suspicious 1 mm lesion to the lower back raised.  NEURO: Normal strength and tone, mentation intact and speech normal  PSYCH: mentation appears normal, affect normal/bright      ASSESSMENT/PLAN:   (Z00.00) Routine general medical examination at a health care facility  (primary encounter diagnosis)  Comment:   Plan: Chlamydia trachomatis PCR, Neisseria " "        gonorrhoeae PCR      (L65.9) Hair loss  Comment: Patient concerned with her hair loss will obtain thyroid.  If thyroid normal will have patient follow-up with dermatology  Plan: TSH with free T4 reflex, DERMATOLOGY REFERRAL         (L98.9) Skin lesion  Comment: 1 mm lesion to the lower back raised.  Will have patient follow-up with dermatology  Plan: DERMATOLOGY REFERRAL      (G47.00) Insomnia, unspecified type  Comment: Controlled with 225 mg of trazodone renewed today  Plan: CBC with platelets differential, traZODone         (DESYREL) 150 MG tablet        COUNSELING:  Reviewed preventive health counseling, as reflected in patient instructions       Regular exercise       Healthy diet/nutrition       Vision screening       Hearing screening       Contraception       Family planning       Safe sex practices/STD prevention    Estimated body mass index is 33.3 kg/m  as calculated from the following:    Height as of this encounter: 1.626 m (5' 4\").    Weight as of this encounter: 88 kg (194 lb).    Weight management plan: Discussed healthy diet and exercise guidelines     reports that she has quit smoking. Her smoking use included cigarettes. She smoked 0.00 packs per day. She has never used smokeless tobacco.      Counseling Resources:  ATP IV Guidelines  Pooled Cohorts Equation Calculator  Breast Cancer Risk Calculator  FRAX Risk Assessment  ICSI Preventive Guidelines  Dietary Guidelines for Americans, 2010  USDA's MyPlate  ASA Prophylaxis  Lung CA Screening    ALEENA Bueno [facility]  "

## 2019-10-18 NOTE — RESULT ENCOUNTER NOTE
Please Notify Amisha  of test results *CBC within normal limits.  Thyroid within normal limits    Ekta Fernandez CNP

## 2019-10-18 NOTE — LETTER
October 21, 2019      Amisha Smith  [address] [place] [id number]        Dear ,    We are writing to inform you of your test results.    Your test results fall within the expected range(s) or remain unchanged from previous results.  Please continue with current treatment plan.    Resulted Orders   TSH with free T4 reflex   Result Value Ref Range    TSH 0.60 0.40 - 4.00 mU/L   CBC with platelets differential   Result Value Ref Range    WBC 6.3 4.0 - 11.0 10e9/L    RBC Count 4.06 3.8 - 5.2 10e12/L    Hemoglobin 13.8 11.7 - 15.7 g/dL    Hematocrit 41.1 35.0 - 47.0 %     (H) 78 - 100 fl    MCH 34.0 (H) 26.5 - 33.0 pg    MCHC 33.6 31.5 - 36.5 g/dL    RDW 11.9 10.0 - 15.0 %    Platelet Count 245 150 - 450 10e9/L    % Neutrophils 69.0 %    % Lymphocytes 18.5 %    % Monocytes 8.4 %    % Eosinophils 3.8 %    % Basophils 0.3 %    Absolute Neutrophil 4.3 1.6 - 8.3 10e9/L    Absolute Lymphocytes 1.2 0.8 - 5.3 10e9/L    Absolute Monocytes 0.5 0.0 - 1.3 10e9/L    Absolute Eosinophils 0.2 0.0 - 0.7 10e9/L    Absolute Basophils 0.0 0.0 - 0.2 10e9/L    Diff Method Automated Method        If you have any questions or concerns, please call the clinic at the number listed above.       Sincerely,        ALEENA Bueno CNP

## 2019-10-20 LAB
C TRACH DNA SPEC QL NAA+PROBE: NEGATIVE
N GONORRHOEA DNA SPEC QL NAA+PROBE: NEGATIVE
SPECIMEN SOURCE: NORMAL
SPECIMEN SOURCE: NORMAL

## 2020-07-23 ENCOUNTER — TELEPHONE (OUTPATIENT)
Dept: FAMILY MEDICINE | Facility: CLINIC | Age: 35
End: 2020-07-23

## 2020-07-23 NOTE — TELEPHONE ENCOUNTER
Reason for Call:  Pt is asking if she is due for a pap test. Last time she had her appt they told her she was to soon for her Pap.  Please Advise.    Phone Number Patient can be reached at: Home number on file [phone] (home)    Best Time: Any Time      Can we leave a detailed message on this number? YES    Call taken on 7/23/2020 at 11:33 AM by Sheri Florez

## 2020-09-15 ENCOUNTER — VIRTUAL VISIT (OUTPATIENT)
Dept: FAMILY MEDICINE | Facility: CLINIC | Age: 35
End: 2020-09-15
Payer: COMMERCIAL

## 2020-09-15 DIAGNOSIS — G47.00 INSOMNIA, UNSPECIFIED TYPE: ICD-10-CM

## 2020-09-15 PROCEDURE — 99213 OFFICE O/P EST LOW 20 MIN: CPT | Mod: 95 | Performed by: NURSE PRACTITIONER

## 2020-09-15 RX ORDER — TRAZODONE HYDROCHLORIDE 150 MG/1
TABLET ORAL
Qty: 135 TABLET | Refills: 3 | Status: SHIPPED | OUTPATIENT
Start: 2020-09-15 | End: 2021-09-23

## 2020-09-15 NOTE — PATIENT INSTRUCTIONS
Patient Education     Insomnia  Insomnia is repeated difficulty going to sleep or staying asleep, or both. Whether you have insomnia is not defined by a specific amount of sleep. Different people need different amounts of sleep, and you may need more or less sleep at different times of your life.  There are 3 major types of insomnia: short-term, chronic, and  other.  Short-term, or acute insomnia lasts less than 3 months. The symptoms are temporary and can be linked directly to a stressor, such as the death of a loved one, financial problems, or a new physical problem. Short-term insomnia stops when the stressor resolves or the person adapts to its presence.  Chronic insomnia occurs at least 3 times a week and lasts longer than 3 months. Chronic insomnia can occur when either the cause of the sleeping problem is not clear, or the insomnia does not get better when the stressor is resolved. A number of other criteria are also used to make the diagnosis of chronic insomnia.    Other insomnia  is the third type of insomnia-related sleep disorders. This description applies to people who have problems getting to sleep or staying asleep, but don't meet all of the factors that describe either short-term or chronic insomnia.    Many things cause insomnia. Different people may have different causes. It can be from an underlying medical or psychological condition, or lifestyle. It can also be primary insomnia, which means no cause can be found.  Causes of insomnia include:    Chronic medical problems- heart disease, gastrointestinal problems, hormonal changes, breathing problems    Anxiety    Stress    Depression    Pain    Work schedule    Sleep apnea    Illegal drugs    Certain medicines  Many different medicines can affect your sleep, such as stimulants, caffeine, alcohol, some decongestants, and diet pills. Other medicines may include some types of blood pressure pills, steroids, asthma medicines, antihistamines,  antidepressants, seizure medicines and statins. Not all of these will affect your sleep, and they shouldn t be stopped without talking to your doctor.  Symptoms of insomnia can include:    Lying awake for long periods at night before falling asleep    Waking up several times during the night    Waking up early in the morning and not being able to get back to sleep    Feeling tired and not refreshed by sleep    Not being able to function properly during the day and finding it hard to concentrate    Irritability    Tiredness and fatigue during the day  Home care    Review your medicines with your doctor or pharmacist to find out if they can cause insomnia. Not all medicines will affect your sleep, but they shouldn't be stopped without reviewing them with your doctor. There may be serious side effects and consequences from suddenly stopping your medicines. Not taking them may cause strokes, heart attacks, and many other problems.    Caffeine, smoking, and alcohol also affect sleep. Limit your daily use and don't use these before bedtime. Alcohol may make you sleepy at first, but as its effects wear off, you may awaken a few hours later and have trouble returning to sleep.    Don't exercise, eat or drink large amounts of liquid within 2 hours of your bedtime.    Improve your sleep habits. Have a fixed bed and wake-up time. Try to keep noise, light and heat in your bedroom at a comfortable level. Try using earplugs or eyeshades if needed.     Don't watch TV in bed.    If you don't fall asleep within 30 minutes, try to relax by reading or listening to soft music.    Limit daytime napping to one 30 minute period, early in the day.    Get regular exercise. Find other ways to lessen your stress level.    If a medicine was prescribed to help reset your sleep patterns, take it as directed. Sleeping pills are intended for short-term use, only. If taken for too long, the effect wears off while the risk of physical addiction and  psychological dependence increases.  Sleep diary  If the cause isn t obvious and it is not improving, try keeping a  sleep diary  for a couple of weeks. Include in it:    The time you go to bed    How long it takes to fall asleep    How many times you wake up    What time you wake up    Your meal times and what you eat    What time you drink alcohol and caffeine    Your exercise habits and times  Follow-up care  Follow up with your healthcare provider, or as advised. If an X-ray or CT scan was done, you will be notified if there is a change in the reading, especially if it affects treatment.  Call 911  Call 911 if any of these occur:    Trouble breathing    Confusion or trouble waking    Fainting or loss of consciousness    Rapid heart rate    New chest, arm, shoulder, neck or upper back pain    Trouble with speech or vision, weakness of an arm or leg    Trouble walking or talking, loss of balance, numbness or weakness in one side of your body, facial droop  When to seek medical advice  Call your healthcare provider right away if any of these occur:    Extreme restlessness or irritability    Confusion or hallucinations (seeing or hearing things that are not there)    Anxiety, depression    Several days without sleeping  Date Last Reviewed: 5/1/2018 2000-2019 [employer]. [address] [place] [address], [place] [address]. All rights reserved. This information is not intended as a substitute for professional medical care. Always follow your healthcare professional's instructions.           Patient Education     Treating Insomnia     Learning to relax before bedtime can improve your sleep.   Good sleeping habits are a key part of treatment. If needed, some medicines may help you sleep better at first. Making healthy lifestyle changes and learning to relax can improve your sleep. Treating insomnia takes commitment. But trust that your efforts will pay off. Be sure to talk with your healthcare provider before  taking any medicine.  Healthy lifestyle  Your lifestyle affects your health and your sleep. Here are some healthy habits:    Keep a regular sleep schedule. Go to bed and get up at the same time each day.    Exercise regularly. It may help you reduce stress. Avoid strenuous exercise for 2 to 4 hours before bedtime.    Avoid or limit naps, especially in the late afternoon.    Use your bed only for sleep and sex.    Don t spend too much time in bed trying to fall asleep. If you can t fall asleep, get up and do something (no electronics) until you become tired and drowsy.    Avoid or limit caffeine and nicotine for up to 6 hours before bedtime. They can keep you awake at night.     Also avoid alcohol for at least 4 to 6 hours before bedtime. It may help you fall asleep at first. But you will have more awakenings during the night. And your sleep will not be restful.  Before bedtime  To sleep better every night, try these tips:    Have a bedtime routine to let your body and mind know when it s time to sleep.    Think of going to bed as relaxing and enjoyable. Sleep will come sooner.    If your worries don t let you sleep, write them down in a diary. Then close it, and go to bed.    Make sure the room is not too hot or too cold. If it s not dark enough, an eye mask can help. If it s noisy, try using earplugs.    Don't eat a large meal just before bedtime.    Remove noises, bright lights, TVs, cell phones, and computers from your sleeping environment.    Use a comfortable mattress and pillow.  Learn to relax  Stress, anxiety, and body tension may keep you awake at night. To unwind before bedtime, try a warm bath, meditation, or yoga. Also try the following:    Deep breathing. Sit or lie back in a chair. Take a slow, deep breath. Hold it for 5 counts. Then breathe out slowly through your mouth. Keep doing this until you feel relaxed.    Progressive muscle relaxation. Tense and then relax the muscles in your body as you  breathe deeply. Start with your feet and work up your body to your neck and face.  Cognitive Behavioral Therapy (CBT)  CBT is the most effective treatment for long-term insomnia. It tries to address the underlying causes of your sleep problems, including your habits and how you think about sleep.  Individual Therapy  Kristopher Mae PsyD, MAYNOR  Insomnia   [facility]    [facility]: [phone]    [facility]: [phone]  [facility]  Visit [email or web address] or call [phone] to find a clinic close to you.  Suggested resources  The resources below may help you relax. This list is for information only. [facility] is not responsible for the quality of services or the actions of any person or organization. There may be a fee to use some of these resources.  Insomnia treatment books  Johny Mind by Madeline Butler and Tonia Gillespie (2013)  Overcoming Insomnia by James Rosales and Madeline Butler (2008)  Quiet Your Mind and Get to Sleep: Solutions to Insomnia for Those with Depression, Anxiety or Chronic Pain by Madeline Butler and Tonia Gillespie (2009)  No More Sleepless Nights by Omar León and Yanelis Bates (1996)  Say Johny to Insomnia by Isaias Douglas (2009)  The Insomnia Workbook by Isabella Magdaleno and Juvenal Burrows (2009)  The Insomnia Answer by Scott Castillo and Blaze Medeiros (2006)  Stress management and relaxation books  The Relaxation and Stress Reduction Workbook by Shania Weeks, Ifrah Van and Tyree Davis (2008)  Stress Management Workbook: Techniques and Self-Assessment Procedures by Ella Baltazar and Devon Collier (1997)  A Mindfulness-Based Stress Reduction Workbook by Karl Mcguire and Livia Hardy (2010)  The Complete Stress Management Workbook by Anirudh Petit, Slim Corbett and Darrell Santoro (1996)  Online programs  www.SHUTi.me (pronounced shut eye). There  is a fee for this program.   www.sleepIO.com (pronounced sleep ee oh). There is a fee for this program.  Other online resources  Deep breathing and progressive muscle relaxation (PMR):    http://www.Housebites.Newsvine  Meditation:    www.fragrantheart.com    www.PeakStreamguidedFligoomeditationFligoosite.Newsvine  Guided imagery:    http://www.Online Milestone Platform    http://SitatByoot.com.Newsvine  (click on  Resource Library,  then choose  Meditation, Relaxation, Guided Imagery )  Apps for your mobile device:    Autogenic Training Progressive Muscle Relaxation by [employer]    Calm: Meditation and Sleep Stories by [employer]    Asysco Timer - Meditation Carleen by [employer].  This is not a prescription and these resources are optional. You must pay for any costs when using these resources. Please ask your insurance carrier if you can be reimbursed for these resources. If so, you are responsible for sending the needed details to your insurance carrier. These resources may also be tax deductible as medical expenses. Check with your .  Date Last Reviewed: 5/18/2018  Modifications clinically reviewed by Kristopher Mae PsyD, MAYNOR, [facility], Director of the Insomnia and Behavioral Sleep Health Program, [facility].    8142-5590 [employer]. [address] [place] [address], [place] [address]. All rights reserved. This information is not intended as a substitute for professional medical care. Always follow your healthcare professional's instructions. This information has been modified by your health care provider with permission from the publisher.

## 2020-09-15 NOTE — PROGRESS NOTES
"Amisha Smith is a 35 year old female who is being evaluated via a billable video visit.      The patient has been notified of following:     \"This video visit will be conducted via a call between you and your physician/provider. We have found that certain health care needs can be provided without the need for an in-person physical exam.  This service lets us provide the care you need with a video conversation.  If a prescription is necessary we can send it directly to your pharmacy.  If lab work is needed we can place an order for that and you can then stop by our lab to have the test done at a later time.    Video visits are billed at different rates depending on your insurance coverage.  Please reach out to your insurance provider with any questions.    If during the course of the call the physician/provider feels a video visit is not appropriate, you will not be charged for this service.\"    Patient has given verbal consent for Video visit? Yes  How would you like to obtain your AVS? Mail a copy  If you are dropped from the video visit, the video invite should be resent to: Text to cell phone: [phone]  Will anyone else be joining your video visit? No    Subjective     Amisha Smith is a 35 year old female who presents today via video visit for the following health issues:    HPI    Medication Followup of trazodone    Taking Medication as prescribed: yes    Side Effects:  None    Medication Helping Symptoms:  yes        Video Start Time: 9:24 AM      Review of Systems   Constitutional, HEENT, cardiovascular, pulmonary, GI, , musculoskeletal, neuro, skin, endocrine and psych systems are negative, except as otherwise noted.      Objective           Vitals:  No vitals were obtained today due to virtual visit.    Physical Exam     GENERAL: Healthy, alert and no distress  EYES: Eyes grossly normal to inspection.  No discharge or erythema, or obvious scleral/conjunctival abnormalities.  RESP: No audible wheeze, " cough, or visible cyanosis.  No visible retractions or increased work of breathing.    SKIN: Visible skin clear. No significant rash, abnormal pigmentation or lesions.  NEURO: Cranial nerves grossly intact.  Mentation and speech appropriate for age.  PSYCH: Mentation appears normal, affect normal/bright, judgement and insight intact, normal speech and appearance well-groomed.      No results found for any visits on 09/15/20.        Assessment & Plan     Insomnia, unspecified type   Controlled no change in treatment plan  - traZODone (DESYREL) 150 MG tablet; Take 1 and 1/2 tablets (total 225 mg) at bedtime PRN           See Patient Instructions    No follow-ups on file.    ALEENA Bueno CNP  [facility]      Video-Visit Details    Type of service:  Video Visit    Video End Time:9:28 AM    Originating Location (pt. Location): Home    Distant Location (provider location):  [facility]     Platform used for Video Visit: Juyd

## 2021-01-25 ENCOUNTER — VIRTUAL VISIT (OUTPATIENT)
Dept: FAMILY MEDICINE | Facility: CLINIC | Age: 36
End: 2021-01-25
Payer: COMMERCIAL

## 2021-01-25 DIAGNOSIS — F41.1 GAD (GENERALIZED ANXIETY DISORDER): ICD-10-CM

## 2021-01-25 DIAGNOSIS — R41.840 INATTENTION: Primary | ICD-10-CM

## 2021-01-25 PROCEDURE — 99214 OFFICE O/P EST MOD 30 MIN: CPT | Mod: 95 | Performed by: NURSE PRACTITIONER

## 2021-01-25 RX ORDER — ATOMOXETINE 40 MG/1
CAPSULE ORAL
Qty: 42 CAPSULE | Refills: 0 | Status: SHIPPED | OUTPATIENT
Start: 2021-01-25 | End: 2021-02-22

## 2021-01-25 NOTE — PROGRESS NOTES
Amisha is a 36 year old who is being evaluated via a billable video visit.      How would you like to obtain your AVS? Mail a copy  If the video visit is dropped, the invitation should be resent by: Text to cell phone: [phone]  Will anyone else be joining your video visit? No      Video Start Time: 11:06 AM  Assessment & Plan     Inattention  Patient has history of anxiety and inattention concern for ADHD due to lack of focus at work.  Patient was last seen for ADHD in 2008 would recommend a more current evaluation to determine if this is ADHD versus uncontrolled anxiety referral was placed placed will have evaluation will start patient on Strattera until evaluation completed and determine next treatment methods  - MENTAL HEALTH REFERRAL  - Adult; Assessments and Testing; ADHD; [facility] [place]: [facility] [phone]; We will contact you to schedule the appointment or please call with any questions  - atomoxetine (STRATTERA) 40 MG capsule; Take 1 capsule (40 mg) by mouth daily for 14 days, THEN 2 capsules (80 mg) daily for 14 days.    LUIS (generalized anxiety disorder)  History of anxiety not responsive to medications at this point will obtain evaluation to determine if it is truly ADHD versus anxiety.  Will await results of evaluation if anxiety is more on the cause would recommend patient follow-up with mental health for medication management due to multiple meds used and not effective  - MENTAL HEALTH REFERRAL  - Adult; Assessments and Testing; ADHD; [facility] [place]: [facility] [phone]; We will contact you to schedule the appointment or please call with any questions    No follow-ups on file.    ALEENA Bueno [facility]    Tita Lion is a 36 year old who presents to clinic today for the following health issues     HPI     Patient states that she would like to discuss starting a new medication. She states that she would just like to discuss  it with Ekta.         Review of Systems   Constitutional, HEENT, cardiovascular, pulmonary, gi and gu systems are negative, except as otherwise noted.      Objective           Vitals:  No vitals were obtained today due to virtual visit.    Physical Exam   GENERAL: Healthy, alert and no distress  EYES: Eyes grossly normal to inspection.  No discharge or erythema, or obvious scleral/conjunctival abnormalities.  RESP: No audible wheeze, cough, or visible cyanosis.  No visible retractions or increased work of breathing.    SKIN: Visible skin clear. No significant rash, abnormal pigmentation or lesions.  NEURO: Cranial nerves grossly intact.  Mentation and speech appropriate for age.  PSYCH: Mentation appears normal, affect normal/bright, judgement and insight intact, normal speech and appearance well-groomed.    No results found for any visits on 01/25/21.            Video-Visit Details    Type of service:  Video Visit    Video End Time:11:21    Originating Location (pt. Location): Home    Distant Location (provider location):  [facility]     Platform used for Video Visit: Judy

## 2021-02-09 ENCOUNTER — VIRTUAL VISIT (OUTPATIENT)
Dept: PSYCHOLOGY | Facility: CLINIC | Age: 36
End: 2021-02-09
Attending: NURSE PRACTITIONER
Payer: COMMERCIAL

## 2021-02-09 DIAGNOSIS — F90.0 ATTENTION DEFICIT HYPERACTIVITY DISORDER, INATTENTIVE TYPE: Primary | ICD-10-CM

## 2021-02-09 PROCEDURE — 90834 PSYTX W PT 45 MINUTES: CPT | Mod: 95 | Performed by: PSYCHOLOGIST

## 2021-02-09 NOTE — PROGRESS NOTES
"                 Progress Note - Initial Visit    Client Name:  Amisha Smith Date: 21         Service Type: Individual     Visit Start Time: 7:08am Visit End Time: 7:59am    Visit #: 1 51 minutes    Attendees: Client attended alone    Service Modality:  Phone Visit:      Provider verified identity through the following two step process.  Patient provided:  Patient     The patient has been notified of the following:      \"We have found that certain health care needs can be provided without the need for a face to face visit.  This service lets us provide the care you need with a phone conversation.       I will have full access to your [facility] medical record during this entire phone call.   I will be taking notes for your medical record.      Since this is like an office visit, we will bill your insurance company for this service.       There are potential benefits and risks of telephone visits (e.g. limits to patient confidentiality) that differ from in-person visits.?Confidentiality still applies for telephone services, and nobody will record the visit.  It is important to be in a quiet, private space that is free of distractions (including cell phone or other devices) during the visit.??      If during the course of the call I believe a telephone visit is not appropriate, you will not be charged for this service\"     Consent has been obtained for this service by care team member: Yes        DATA:   Interactive Complexity: No   Crisis: No     Presenting Concerns/  Current Stressors:   \" I had to do ACE training at my job ([employer]). They're a lot longer than our LMS and I could not pass these simple tests that I've been taking yearly forever.  My boss told me that I have a lot of overdue trainings and I couldn't pass them.  So, I talked to Ekta (primary care physician) who referred me to get tested (for ADHD).  It really started to affect my job.  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   Eye Contact:   This session was held solely via telephone call; therefore, her eye contact could not be observed.   Psychomotor Behavior: This session was held solely via telephone call; therefore, her psychomotor behavior could not be observed.   Attitude:   Cooperative  Friendly Pleasant  Orientation:   All  Speech   Rate / Production: Normal/ Responsive   Volume:  Normal   Mood:    Anxious   Affect:    This session was held solely via telephone call; therefore, her affect could not be observed.   Thought Content:  Clear   Thought Form:  Coherent   Insight:    Fair       Safety Issues and Plan for Safety and Risk Management:   [place] Suicide Severity Rating Scale (Lifetime/Recent)No flowsheet data found.  Patient denies current fears or concerns for personal safety.  Patient denies current or recent suicidal ideation or behaviors.  Patient denies current or recent homicidal ideation or behaviors.  Patient denies current or recent self injurious behavior or ideation.  Patient denies other safety concerns.  Recommended that patient call 911 or go to the local ED should there be a change in any of these risk factors.  Patient reports there are no firearms in the house.     Diagnostic Criteria:  A) A persistent pattern of inattention and/or hyperactivity-impulsivity that interferes with functioning or development, as characterized by (1) Inattention and/or (2) Hyperactivity and Impulsivity  - Often has difficulty sustaining attention in tasks or play activities  - Often has difficulty organizing tasks and activities  - Is often easily distractedby extraneous stimuli  D) " There is clear evidence that the symptoms interfere with, or reduce the quality of, social academic, or occupational functioning  E) The Symptoms do not occur exclusively during the course of schizophrenia or another psychotic disorder and are not better explained by another mental disorder      DSM5 Diagnoses: (Sustained by DSM5 Criteria Listed Above)  Diagnoses: Attention-Deficit/Hyperactivity Disorder  314.00 (F90.0) Predominantly inattentive presentation (Provisional)  Psychosocial & Contextual Factors: Client is doing new online trainings for her job and is unable to pass them due to lack of focus and concentration  WHODAS 2.0 (12 item):   WHODAS 2.0 Total Score 2/9/2021   Total Score 23     Intervention:   Client was provided skills to practice during the next two weeks to help manage symptoms of getting distracted, disorganization, and forgetfulness (skills to manage possible symptoms of ADHD).  Educated on assessment process, clarifying questions, rapport building skills.  Collateral Reports Completed:  Not Applicable      PLAN: (Homework, other):  1. Provider will continue Diagnostic Assessment.  Patient was given the following to do until next session:  Client was provided with Mindfulness techniques to help with organization, forgetfulness, and getting distracted.  She will also be mailed ([employer] by her request) the ADHD Questionnaires to be completed and returned.    2. Provider recommended the following referrals: Client had questions about continuing to take Straterra.  After a discussion she was referred back to ALEENA Ramirez CNP, her PCP.      3.  Safety plan was not created.  Provider recommended that patient calling 911 or someone from her social network if safety concerns were present. The client did not endorse any safety concerns over her lifetime.       Timothy Johnson  February 9, 2021

## 2021-02-13 ENCOUNTER — HEALTH MAINTENANCE LETTER (OUTPATIENT)
Age: 36
End: 2021-02-13

## 2021-02-22 ENCOUNTER — VIRTUAL VISIT (OUTPATIENT)
Dept: FAMILY MEDICINE | Facility: CLINIC | Age: 36
End: 2021-02-22
Payer: COMMERCIAL

## 2021-02-22 DIAGNOSIS — F90.2 ATTENTION DEFICIT HYPERACTIVITY DISORDER (ADHD), COMBINED TYPE: Primary | ICD-10-CM

## 2021-02-22 PROCEDURE — 99213 OFFICE O/P EST LOW 20 MIN: CPT | Mod: 95 | Performed by: NURSE PRACTITIONER

## 2021-02-22 RX ORDER — ATOMOXETINE 80 MG/1
80 CAPSULE ORAL DAILY
Qty: 30 CAPSULE | Refills: 0 | Status: SHIPPED | OUTPATIENT
Start: 2021-02-22 | End: 2021-03-15

## 2021-02-22 NOTE — PROGRESS NOTES
Amisha is a 36 year old who is being evaluated via a billable video visit.      How would you like to obtain your AVS? MyChart  If the video visit is dropped, the invitation should be resent by: Text to cell phone: [phone]  Will anyone else be joining your video visit? No    Video Start Time: 10:32 AM    Assessment & Plan     Attention deficit hyperactivity disorder (ADHD), combined type  Mild improvement will continue strattera at 80 mgs if not improving in the next 2-3 weeks patient will follow-up and review medication change   - atomoxetine (STRATTERA) 80 MG capsule; Take 1 capsule (80 mg) by mouth daily  [id number]}       No follow-ups on file.    ALEENA Bueno [facility]    Subjective   Amisha is a 36 year old who presents for the following health issues     HPI       Medication Followup of Strattera    Taking Medication as prescribed: yes    Side Effects:  Nausea, fatigue- seems to be getting better now    Medication Helping Symptoms:  She is not sure       Review of Systems   Constitutional, HEENT, cardiovascular, pulmonary, gi and gu systems are negative, except as otherwise noted.      Objective           Vitals:  No vitals were obtained today due to virtual visit.    Physical Exam   GENERAL: Healthy, alert and no distress  EYES: Eyes grossly normal to inspection.  No discharge or erythema, or obvious scleral/conjunctival abnormalities.  RESP: No audible wheeze, cough, or visible cyanosis.  No visible retractions or increased work of breathing.    SKIN: Visible skin clear. No significant rash, abnormal pigmentation or lesions.  NEURO: Cranial nerves grossly intact.  Mentation and speech appropriate for age.  PSYCH: Mentation appears normal, affect normal/bright, judgement and insight intact, normal speech and appearance well-groomed.                Video-Visit Details    Type of service:  Video Visit    Video End Time:10:45     Originating Location (pt. Location):  Home    Distant Location (provider location):  [facility]     Platform used for Video Visit: Hardy

## 2021-02-23 ENCOUNTER — VIRTUAL VISIT (OUTPATIENT)
Dept: PSYCHOLOGY | Facility: CLINIC | Age: 36
End: 2021-02-23
Payer: COMMERCIAL

## 2021-02-23 DIAGNOSIS — F90.0 ATTENTION DEFICIT HYPERACTIVITY DISORDER, INATTENTIVE TYPE: Primary | ICD-10-CM

## 2021-02-23 PROCEDURE — 90791 PSYCH DIAGNOSTIC EVALUATION: CPT | Mod: 95 | Performed by: PSYCHOLOGIST
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
## 2021-03-03 ENCOUNTER — VIRTUAL VISIT (OUTPATIENT)
Dept: PSYCHOLOGY | Facility: CLINIC | Age: 36
End: 2021-03-03
Payer: COMMERCIAL

## 2021-03-03 DIAGNOSIS — F90.0 ATTENTION DEFICIT HYPERACTIVITY DISORDER, INATTENTIVE TYPE: Primary | ICD-10-CM

## 2021-03-03 PROCEDURE — 96130 PSYCL TST EVAL PHYS/QHP 1ST: CPT | Mod: 95 | Performed by: PSYCHOLOGIST

## 2021-03-03 PROCEDURE — 96131 PSYCL TST EVAL PHYS/QHP EA: CPT | Mod: 95 | Performed by: PSYCHOLOGIST

## 2021-03-03 NOTE — PROGRESS NOTES
Patient Name:  Amisha Smith   Date: 3/3/21  YOB: 1985  MRN: [id number]  Date(s) of assessment:    Psychological Testing  Billing/Services Summary     Integration/Report Generation  (Start/Stop), (Date, Day #)  2:58pm/3:43pm                2/26/21 / Day 1           45 minutes  7:23pm/7:56pm                3/3/21 / Day 2             33 minutes     Interactive Feedback Session   (Start/Stop), (Date, Day #)  11:01am/11:43am             3/3/21 / Day 2            42 minutes        Total Time:     120 Minutes (2 hours, 00 minutes)     Total Units:                1 (95570)         1 [id number])           Service Type: Individual      Session #: 3    Attendees: Client attended alone    Service Modality:  Video Visit:      Provider verified identity through the following two step process.  Patient provided:  Patient photo    Telemedicine Visit: The patient's condition can be safely assessed and treated via synchronous audio and visual telemedicine encounter.      Reason for Telemedicine Visit: Services only offered telehealth    Originating Site (Patient Location): Patient's place of employment    Distant Site (Provider Location): Provider Remote Setting    Consent:  The patient/guardian has verbally consented to: the potential risks and benefits of telemedicine (video visit) versus in person care; bill my insurance or make self-payment for services provided; and responsibility for payment of non-covered services.     Patient would like the video invitation sent by:  Send to e-mail at: [email or web address]    Mode of Communication:  Video Conference via [employer]    As the provider I attest to compliance with applicable laws and regulations related to telemedicine.    DATA  Interactive Complexity: No  Crisis: No      Information about appointment:  Client attended three sessions to aid in determining client's mental health diagnosis or diagnoses and treatment recommendations that best address client concerns.  "Client records includingmedical were reviewed. A diagnostic assessment was conducted at the initial appointment. Client completed several rating scales to assist in assessing attention-related and other mental health symptoms that may be causing impairments in functioning. Rating scales were also completed by a collateral contact.    Assessment tools:      Derick Adult ADHD Rating Scale-IV: Self and Other Reports (BAARS-IV), Derick Functional Impairment Scale: Self and Other Reports (BFIS), Derick Deficits in Executive Functioning Scale: Self and Other Reports (BDEFS), Patient Health Questionnaire-9 (PHQ-9) and Generalized Anxiety Disorder-7 (LUIS-7)    Assessment Results:    Derick Adult ADHD Rating Scale-IV: Self and Other Reports (BAARS-IV)  The BAARS-IV assesses for symptoms of ADHD that are experienced in one's daily life. This assessment measure includes self and collateral rating scales designed to provide information regarding current and childhood symptoms of ADHD including inattention, hyperactivity, and impulsivity. Self-report scores are reported as percentiles. Scores at the 76th-83rd percentile are considered marginal, scores at the 84th-92nd percentile are considered borderline, scores at the 93rd-95th percentile are considered mild, scores at the 96th-98th percentile are considered moderate, and those at the 99th percentile are considered severe. Collateral or \"other\" rating scales are reported as number of symptoms observed in comparison to those reported by the client. Norms and percentile scores are not available for collateral reports.     Current Symptoms Scale--Self Report:   Client completed the self-report inventory of current symptoms. The results indicate that the client's Total ADHD Score was 41 which places her in the 93rd percentile for overall ADHD symptoms. In addition, the client endorsed 7/9 (98th percentile) Inattention symptoms, 0/5 (1st-75th percentile) Hyperactivity symptoms, " 0/4 (1st-75th percentile) Impulsivity symptoms, and 7/9 (97th percentile) Sluggish Cognitive Tempo symptoms. Client indicated that the reported symptoms have resulted in impaired functioning in school, home and social relationships. Overall, the results suggest the client is expeiencing Mild ADHD symptoms.    Current Symptoms Scale--Other Report:  Client's friend completed the collateral report inventory of current symptoms. Based on the collateral contact's observation of symptoms, the client demonstrates 6/9 Inattention symptoms, 0/5 Hyperactivity symptoms, 0/4 Impulsivity symptoms, and 7/9 Sluggish Cognitive Tempo symptoms. The client's Total ADHD Score was 39. The collateral contact indicated the client demonstrates impaired functioning in home, work and social relationships The collateral- and self-report scores are not significantly different.    Childhood Symptoms Scale--Self-Report:  Client completed the self-report inventory of childhood symptoms. The results indicate that the client's Total ADHD Score was 36 which places her in the 82nd percentile for overall ADHD symptoms in childhood. In addition, the client endorsed 7/9 (96th percentile) Inattention symptoms and 0/9 (1st-75th percentile) Hyperactivity-Impulsivity symptoms. Client indicated that the reported symptoms resulted in impaired functioning in school and social relationships Overall, the results suggest the client experienced  Marginal symptoms of ADHD as a child.     Childhood Symptoms Scale--Other Report:  Client's friend completed the collateral report inventory of childhood symptoms. Based on the collateral contact's recollection of client's childhood symptoms, the client demonstrated 6/9 Inattention symptoms and 0/9 Hyperactivity-Impulsivity symptoms. The client's Total ADHD Score was 39. The collateral contact indicated the client demonstrates impaired functioning in home and social relationshipsThe collateral- and self-report scores are  "not significantly different.                          Derick Functional Impairment Scale: Self and Other Reports (BFIS)  The BFIS is used to assess an individuals' psychosocial impairment in major life/daily activities that may be due to a mental health disorder. This assessment measure includes self and collateral rating scales. Self-report scores are reported as percentiles. Scores at the 76th-83rd percentile are considered marginal, scores at the 84th-92nd percentile are considered borderline, scores at the 93rd-95th percentile are considered mild, scores at the 96th-98th percentile are considered moderate, and those at the 99th percentile are considered severe.Collateral or \"other\" rating scales are reported as number of symptoms observed in comparison to those reported by the client. Norms and percentile scores are not available for collateral reports.     Results indicate the client identified impairment (scores at or greater than 93rd percentile) in the following areas: home-chores, work, social-strangers, social-friends, education, marriage/cohabiting/dating, daily responsibilities and health maintenance The client's Mean Impairment Score was 5.6 (93rd percentile) indicating the client is reporting Mild impairment in functioning across domains. Client's friend completed the collateral rating scale, which indicated similar results. The collateral contact's scores were generally lower than the client's report.     Derick Deficits in Executive Functioning Scale (BDEFS)  The BDEFS is a measure used for evaluating dimensions of adult executive functioning in daily life.This assessment measure includes self and collateral rating scales. Self-report scores are reported as percentiles. Scores at the 76th-83rd percentile are considered marginal, scores at the 84th-92nd percentile are considered borderline, scores at the 93rd-95th percentile are considered mild, scores at the 96th-98th percentile are considered " "moderate, and those at the 99th percentile are considered severe.Collateral or \"other\" rating scales are reported as number of symptoms observed in comparison to those reported by the client. Norms and percentile scores are not available for collateral reports.     Results indicate the client's Total Executive Functioning Score was 189 (91st percentile). The ADHD-Executive Functioning Index score was 25 (93rd percentile). These scores suggest the client has Borderline deficits in executive functioning. These deficits are likely to be due to ADHD. Results indicate the client identified significant deficits in the following areas: self-management to time (97th percentile)  and self-organization/problem-solving (96th percentile). Client's friend completed the collateral rating scale, which indicated discrepant results. The collateral contact's scores were generally lower than the client's report.      Generalized Anxiety Disorder Questionnaire (LUIS-7)  This questionnaire is designed to assess for anxiety in adults.  Based on the score, she is experiencing minimal symptoms of anxiety. Client identified the following symptoms of anxiety: becoming easily annoyed or irritable    Patient Health Questionnaire- 9 (PHQ-9)   This questionnaire is designed to assess for depression in adults.  Based on the score, she is experiencing mild symptoms of depression. Client identified the following symptoms of depression: depressed mood, difficulty with sleep and poor concentration.         Summary (based on clinical interview, review of records, test results):  Based on the client's Derikc Questionnaires, LUIS-7, PHQ-9, and interview results, the client has been diagnosed with Attention-Deficit/Hyperactivity Disorder, Predominantly inattentive presentation (ADHD).    Her BAARS-IV: Self-Current scores ranked her in the Mild range for symptoms of ADHD and her Childhood scores fell within the Marginal range.  However, scores for " Inattention on each questionnaire fell within the Moderate range for ADHD.  Scores from the Other-Collateral  for both the Current and Childhood questionnaires supported the Self scores.  Self-scores regarding the client's Executive Functioning (BDEFS-LF) were not significant with the exception of the sub-categories of Self-Management to Time and Self-organization/Problem Solving.  Again, this was supported by the client's Other .  Both the Self- and Other-reports indicated that the client struggles significantly in multiple important areas of her daily life (BFIS).  The client's PHQ-9 score of 9 indicates possible Moderately severe depression and her score of 1 on the LUIS-7 indicates minimal anxiety.    These scores, along with the interview, suggests the client does struggle with ADHD, inattentive type.  Even though her overall childhood score was Marginal for ADHD, her Inattention score was Moderate for ADHD, much like her Current symptoms score and both of her collateral reporters scores.  Many of her symptoms from her PHQ-9 results could be attributed to her ADHD; therefore, a diagnosis for depression was not given at this time.    Diagnosis:   314.00 (F90.0)  Attention-Deficit/Hyperactivity Disorder, Predominantly inattentive presentation    Recommendations:    Schedule an appointment with your physician to discuss a medication evaluation., Access resources through websites, books, and articles such as those provided  in the handout., Consider working with an ADHD  or individual therapist to learn skills to  assist with symptom management, as well as ways to improve relationships,  etc that may have been impacted by your symptoms. , Consider attending workshops or support groups through [employer] ([email or web address]). and Schedule a follow-up appointment with me in about six weeks to review symptoms, treatment involvement, and struggles and/or successes.    Timothy Johnson

## 2021-03-05 ENCOUNTER — VIRTUAL VISIT (OUTPATIENT)
Dept: FAMILY MEDICINE | Facility: CLINIC | Age: 36
End: 2021-03-05
Payer: COMMERCIAL

## 2021-03-05 DIAGNOSIS — F90.0 ATTENTION DEFICIT HYPERACTIVITY DISORDER (ADHD), PREDOMINANTLY INATTENTIVE TYPE: ICD-10-CM

## 2021-03-05 DIAGNOSIS — F90.2 ATTENTION DEFICIT HYPERACTIVITY DISORDER (ADHD), COMBINED TYPE: Primary | ICD-10-CM

## 2021-03-05 PROCEDURE — 99214 OFFICE O/P EST MOD 30 MIN: CPT | Mod: 95 | Performed by: NURSE PRACTITIONER

## 2021-03-05 RX ORDER — DEXTROAMPHETAMINE SACCHARATE, AMPHETAMINE ASPARTATE MONOHYDRATE, DEXTROAMPHETAMINE SULFATE AND AMPHETAMINE SULFATE 2.5; 2.5; 2.5; 2.5 MG/1; MG/1; MG/1; MG/1
10 CAPSULE, EXTENDED RELEASE ORAL DAILY
Qty: 30 CAPSULE | Refills: 0 | Status: SHIPPED | OUTPATIENT
Start: 2021-03-05 | End: 2021-04-26

## 2021-03-05 NOTE — PROGRESS NOTES
Amisha is a 36 year old who is being evaluated via a billable video visit.      How would you like to obtain your AVS? MyChart  If the video visit is dropped, the invitation should be resent by: Text to cell phone: [phone]  Will anyone else be joining your video visit? No    Video Start Time: 7:17 AM    Assessment & Plan     Attention deficit hyperactivity disorder (ADHD), combined type  Uncontrolled patient has recent diagnosis of ADHD.  Was on Strattera Strattera was not effective patient has discontinued use of Strattera.  We will start patient on Adderall patient will start at 10 mg if not improving over the first week patient will titrate up to 20 mg 2 tablets and will contact me so I can send in a prescription to cover the rest of the month.  Patient will have follow-up in 1 month once we have a correct dose for patient to control her ADHD she can have 3 months of refills.  Patient will schedule a video visit in 1 month and will contact me in 1 week to let me know if the 10 mg is effective or if we need to titrate up to 20.  - amphetamine-dextroamphetamine (ADDERALL XR) 10 MG 24 hr capsule; Take 1 capsule (10 mg) by mouth daily    Attention deficit hyperactivity disorder (ADHD), predominantly inattentive type         See Patient Instructions    No follow-ups on file.    ALEENA Bueno [facility]    Tita Lion is a 36 year old who presents for the following health issues     HPI       Medication Followup of Strattera    Taking Medication as prescribed: yes    Side Effects:  None    Medication Helping Symptoms:  NO           Review of Systems   Constitutional, HEENT, cardiovascular, pulmonary, gi and gu systems are negative, except as otherwise noted.      Objective           Vitals:  No vitals were obtained today due to virtual visit.    Physical Exam   GENERAL: Healthy, alert and no distress  EYES: Eyes grossly normal to inspection.  No discharge or erythema,  or obvious scleral/conjunctival abnormalities.  RESP: No audible wheeze, cough, or visible cyanosis.  No visible retractions or increased work of breathing.    SKIN: Visible skin clear. No significant rash, abnormal pigmentation or lesions.  NEURO: Cranial nerves grossly intact.  Mentation and speech appropriate for age.  PSYCH: Mentation appears normal, affect normal/bright, judgement and insight intact, normal speech and appearance well-groomed.              Video-Visit Details    Type of service:  Video Visit    Video End Time:7:31 AM    Originating Location (pt. Location): Home    Distant Location (provider location):  [facility]     Platform used for Video Visit: Judy

## 2021-03-11 ENCOUNTER — TELEPHONE (OUTPATIENT)
Dept: FAMILY MEDICINE | Facility: CLINIC | Age: 36
End: 2021-03-11

## 2021-03-11 DIAGNOSIS — F90.2 ATTENTION DEFICIT HYPERACTIVITY DISORDER (ADHD), COMBINED TYPE: Primary | ICD-10-CM

## 2021-03-11 NOTE — TELEPHONE ENCOUNTER
Reason for Call:  Other prescription    Detailed comments: Pt was started on a low dose Adderall. Pt was started on 10 mg daily. Pt said she has been sleeping better then ever on this dose. Let AFIA Fernandez know that this is working. Pt said she is ready for the 20 mg increase. Please Advise.    Phone Number Patient can be reached at: Home number on file [phone] (home)    Best Time: Any Time      Can we leave a detailed message on this number? YES    Call taken on 3/11/2021 at 2:55 PM by Sheri Florez

## 2021-03-15 DIAGNOSIS — F90.2 ATTENTION DEFICIT HYPERACTIVITY DISORDER (ADHD), COMBINED TYPE: ICD-10-CM

## 2021-03-15 RX ORDER — DEXTROAMPHETAMINE SACCHARATE, AMPHETAMINE ASPARTATE, DEXTROAMPHETAMINE SULFATE AND AMPHETAMINE SULFATE 5; 5; 5; 5 MG/1; MG/1; MG/1; MG/1
20 TABLET ORAL DAILY
Qty: 30 TABLET | Refills: 0 | Status: SHIPPED | OUTPATIENT
Start: 2021-03-15 | End: 2021-04-14

## 2021-03-15 NOTE — TELEPHONE ENCOUNTER
Notify patient that I sent in a prescription for the 20 mg tablets.  Will need an virtual visit for refills or dose change.    Ekta Fernandez CNP

## 2021-03-16 RX ORDER — ATOMOXETINE 80 MG/1
80 CAPSULE ORAL DAILY
Qty: 15 CAPSULE | Refills: 1 | OUTPATIENT
Start: 2021-03-16

## 2021-03-22 ENCOUNTER — MYC MEDICAL ADVICE (OUTPATIENT)
Dept: FAMILY MEDICINE | Facility: CLINIC | Age: 36
End: 2021-03-22

## 2021-03-22 DIAGNOSIS — F90.2 ATTENTION DEFICIT HYPERACTIVITY DISORDER (ADHD), COMBINED TYPE: Primary | ICD-10-CM

## 2021-03-22 RX ORDER — DEXTROAMPHETAMINE SACCHARATE, AMPHETAMINE ASPARTATE MONOHYDRATE, DEXTROAMPHETAMINE SULFATE AND AMPHETAMINE SULFATE 5; 5; 5; 5 MG/1; MG/1; MG/1; MG/1
20 CAPSULE, EXTENDED RELEASE ORAL DAILY
Qty: 30 CAPSULE | Refills: 0 | Status: SHIPPED | OUTPATIENT
Start: 2021-03-22 | End: 2022-04-29

## 2021-04-26 ENCOUNTER — VIRTUAL VISIT (OUTPATIENT)
Dept: FAMILY MEDICINE | Facility: CLINIC | Age: 36
End: 2021-04-26
Payer: COMMERCIAL

## 2021-04-26 DIAGNOSIS — F90.2 ATTENTION DEFICIT HYPERACTIVITY DISORDER (ADHD), COMBINED TYPE: Primary | ICD-10-CM

## 2021-04-26 PROCEDURE — 99214 OFFICE O/P EST MOD 30 MIN: CPT | Mod: 95 | Performed by: NURSE PRACTITIONER

## 2021-04-26 RX ORDER — DEXTROAMPHETAMINE SACCHARATE, AMPHETAMINE ASPARTATE MONOHYDRATE, DEXTROAMPHETAMINE SULFATE AND AMPHETAMINE SULFATE 5; 5; 5; 5 MG/1; MG/1; MG/1; MG/1
20 CAPSULE, EXTENDED RELEASE ORAL DAILY
Qty: 30 CAPSULE | Refills: 0 | Status: SHIPPED | OUTPATIENT
Start: 2021-05-27 | End: 2021-06-03

## 2021-04-26 RX ORDER — DEXTROAMPHETAMINE SACCHARATE, AMPHETAMINE ASPARTATE, DEXTROAMPHETAMINE SULFATE AND AMPHETAMINE SULFATE 1.25; 1.25; 1.25; 1.25 MG/1; MG/1; MG/1; MG/1
5 TABLET ORAL DAILY
Qty: 30 TABLET | Refills: 0 | Status: SHIPPED | OUTPATIENT
Start: 2021-04-26 | End: 2021-05-10

## 2021-04-26 RX ORDER — DEXTROAMPHETAMINE SACCHARATE, AMPHETAMINE ASPARTATE MONOHYDRATE, DEXTROAMPHETAMINE SULFATE AND AMPHETAMINE SULFATE 5; 5; 5; 5 MG/1; MG/1; MG/1; MG/1
20 CAPSULE, EXTENDED RELEASE ORAL DAILY
Qty: 30 CAPSULE | Refills: 0 | Status: SHIPPED | OUTPATIENT
Start: 2021-04-26 | End: 2021-05-26

## 2021-04-26 RX ORDER — DEXTROAMPHETAMINE SACCHARATE, AMPHETAMINE ASPARTATE MONOHYDRATE, DEXTROAMPHETAMINE SULFATE AND AMPHETAMINE SULFATE 5; 5; 5; 5 MG/1; MG/1; MG/1; MG/1
20 CAPSULE, EXTENDED RELEASE ORAL DAILY
Qty: 30 CAPSULE | Refills: 0 | Status: SHIPPED | OUTPATIENT
Start: 2021-06-27 | End: 2021-06-03

## 2021-04-26 RX ORDER — DEXTROAMPHETAMINE SACCHARATE, AMPHETAMINE ASPARTATE, DEXTROAMPHETAMINE SULFATE AND AMPHETAMINE SULFATE 1.25; 1.25; 1.25; 1.25 MG/1; MG/1; MG/1; MG/1
5 TABLET ORAL DAILY
Qty: 30 TABLET | Refills: 0 | Status: SHIPPED | OUTPATIENT
Start: 2021-06-27 | End: 2021-05-10

## 2021-04-26 RX ORDER — DEXTROAMPHETAMINE SACCHARATE, AMPHETAMINE ASPARTATE, DEXTROAMPHETAMINE SULFATE AND AMPHETAMINE SULFATE 1.25; 1.25; 1.25; 1.25 MG/1; MG/1; MG/1; MG/1
5 TABLET ORAL DAILY
Qty: 30 TABLET | Refills: 0 | Status: SHIPPED | OUTPATIENT
Start: 2021-05-27 | End: 2021-05-10

## 2021-04-26 NOTE — PROGRESS NOTES
Amisha is a 36 year old who is being evaluated via a billable video visit.      How would you like to obtain your AVS? MyChart  If the video visit is dropped, the invitation should be resent by: Text to cell phone: [phone]  Will anyone else be joining your video visit? No    Video Start Time: 7:25 AM    Assessment & Plan     Attention deficit hyperactivity disorder (ADHD), combined type  20 mg extended release is holding her through the day however does have some breakthrough inattention in the afternoon will attempt 5 mg immediate release.  Adderall refilled for 3 months patient will contact me if the extra 5 mg is not working if everything working patient will have follow-up video visit in 3 months  - amphetamine-dextroamphetamine (ADDERALL) 5 MG tablet  Dispense: 30 tablet; Refill: 0  - amphetamine-dextroamphetamine (ADDERALL) 5 MG tablet  Dispense: 30 tablet; Refill: 0  - amphetamine-dextroamphetamine (ADDERALL) 5 MG tablet  Dispense: 30 tablet; Refill: 0  - amphetamine-dextroamphetamine (ADDERALL XR) 20 MG 24 hr capsule  Dispense: 30 capsule; Refill: 0  - amphetamine-dextroamphetamine (ADDERALL XR) 20 MG 24 hr capsule  Dispense: 30 capsule; Refill: 0  - amphetamine-dextroamphetamine (ADDERALL XR) 20 MG 24 hr capsule  Dispense: 30 capsule; Refill: 0    Return in about 3 months (around 7/26/2021) for using a video visit.    ALEENA Bueno CNP  [facility]    Subjective   Amisha is a 36 year old who presents for the following health issues     HPI       Medication Followup of  ADHD : for insurance purpose she needs brand adderall XR      Taking Medication as prescribed: yes    Side Effects:  None    Medication Helping Symptoms:  yes         Review of Systems   Constitutional, HEENT, cardiovascular, pulmonary, gi and gu systems are negative, except as otherwise noted.      Objective           Vitals:  No vitals were obtained today due to virtual visit.    Physical Exam   GENERAL:  Healthy, alert and no distress  EYES: Eyes grossly normal to inspection.  No discharge or erythema, or obvious scleral/conjunctival abnormalities.  RESP: No audible wheeze, cough, or visible cyanosis.  No visible retractions or increased work of breathing.    SKIN: Visible skin clear. No significant rash, abnormal pigmentation or lesions.  NEURO: Cranial nerves grossly intact.  Mentation and speech appropriate for age.  PSYCH: Mentation appears normal, affect normal/bright, judgement and insight intact, normal speech and appearance well-groomed.            Video-Visit Details    Type of service:  Video Visit    Video End Time: 7:43     Originating Location (pt. Location): Home    Distant Location (provider location):  [facility]     Platform used for Video Visit: [employer]

## 2021-04-26 NOTE — PATIENT INSTRUCTIONS
Patient Education     Attention-Deficit/Hyperactivity Disorder (ADHD) in Adults  You ve always had trouble concentrating. Your mind wanders, and it s hard to finish tasks. As a result, you didn t do well in school. And now, you often struggle with your job. Sometimes this makes you cosme or depressed. These may be symptoms of attention-deficit/hyperactivity disorder (ADHD). To find out more, talk to your healthcare provider. He or she can offer guidance and support.  Symptoms of ADHD in adults  For an adult to be diagnosed with ADHD, the symptoms must have been present since childhood. The symptoms may include:    Trouble thinking things through    Low self-esteem    Depression    Trouble holding a job    Memory problems    Problems with a marriage or relationship    Lack of discipline    Trouble finishing tasks or projects  What is ADHD?  Attention-deficit/hyperactivity disorder makes it hard to focus your mind. You may daydream a lot. And you may be restless much of the time. As a result, you may have trouble with detailed or boring work. And it may be hard to stick with one project for very long. You also may forget things. Or, you may miss key points during a lecture or meeting. You may even have trouble sitting through a movie or concert. At times, you may feel frustrated or angry. This can affect your relationships with others.  Who does it affect?  ADHD starts in childhood. Sometimes, your symptoms may improve as you get older. But they also may persist into your adult years. ADHD is often thought of as a  kid s problem.  That s why it s often missed in adults. In fact, many parents learn they have ADHD when their children are diagnosed.  What causes it?  The exact cause of ADHD isn t known. The disorder does run in families. Having one parent with ADHD makes it more likely you ll have it too. And the part of your brain that controls attention may be involved. Certain brain chemicals that are out of balance  may also play a role.  What can be done?  The first step is finding out if you have ADHD. Your doctor will use special guidelines to diagnose the disorder. Most adults with ADHD are greatly helped by some combination of medicine, therapy and coaching.  To learn more    Children and Adults with Attention-Deficit/Hyperactivity Disorder (MATTY) https://matty.org/    Attention Deficit Disorder Association (ADDA) https://add.org/    Blas last reviewed this educational content on 4/1/2020 2000-2021 [employer]. All rights reserved. This information is not intended as a substitute for professional medical care. Always follow your healthcare professional's instructions.

## 2021-05-07 ENCOUNTER — MYC MEDICAL ADVICE (OUTPATIENT)
Dept: FAMILY MEDICINE | Facility: CLINIC | Age: 36
End: 2021-05-07

## 2021-05-07 DIAGNOSIS — F90.2 ATTENTION DEFICIT HYPERACTIVITY DISORDER (ADHD), COMBINED TYPE: ICD-10-CM

## 2021-05-10 RX ORDER — DEXTROAMPHETAMINE SACCHARATE, AMPHETAMINE ASPARTATE, DEXTROAMPHETAMINE SULFATE AND AMPHETAMINE SULFATE 1.25; 1.25; 1.25; 1.25 MG/1; MG/1; MG/1; MG/1
5 TABLET ORAL 2 TIMES DAILY
Qty: 15 TABLET | Refills: 0 | Status: SHIPPED | OUTPATIENT
Start: 2021-05-10 | End: 2021-06-03

## 2021-05-10 RX ORDER — DEXTROAMPHETAMINE SACCHARATE, AMPHETAMINE ASPARTATE, DEXTROAMPHETAMINE SULFATE AND AMPHETAMINE SULFATE 1.25; 1.25; 1.25; 1.25 MG/1; MG/1; MG/1; MG/1
5 TABLET ORAL 2 TIMES DAILY
Qty: 60 TABLET | Refills: 0 | Status: SHIPPED | OUTPATIENT
Start: 2021-06-26 | End: 2021-05-24

## 2021-05-10 NOTE — TELEPHONE ENCOUNTER
Will increase adderall 5 mg to BID will send over an order for 15 additional tablets to cover for the rest of May until 6/24/2021 refill due.  Did refill 5 mg adderall for 6/24/2021 for 60 tablet BID.  Will need appointment for further refills as she will be due for an appointment in 7/2021.     Ekta Fernandez CNP

## 2021-05-24 ENCOUNTER — TELEPHONE (OUTPATIENT)
Dept: FAMILY MEDICINE | Facility: CLINIC | Age: 36
End: 2021-05-24

## 2021-05-24 DIAGNOSIS — F90.2 ATTENTION DEFICIT HYPERACTIVITY DISORDER (ADHD), COMBINED TYPE: ICD-10-CM

## 2021-05-24 RX ORDER — DEXTROAMPHETAMINE SACCHARATE, AMPHETAMINE ASPARTATE, DEXTROAMPHETAMINE SULFATE AND AMPHETAMINE SULFATE 1.25; 1.25; 1.25; 1.25 MG/1; MG/1; MG/1; MG/1
5 TABLET ORAL 2 TIMES DAILY
Qty: 60 TABLET | Refills: 0 | Status: SHIPPED | OUTPATIENT
Start: 2021-05-24 | End: 2021-06-23

## 2021-05-24 NOTE — TELEPHONE ENCOUNTER
Reason for call:  Patient reporting a symptom    Symptom or request: Pharmacy - Jesus is requesting Clarification on her Adderall 5 mg - 5/10/21     Phone Number patient can be reached at:  Home number on file [facility] [phone]     Best Time:  Any Time      Can we leave a detailed message on this number:  YES    Call taken on 5/24/2021 at 2:46 PM by Sheri Florez

## 2021-05-26 ENCOUNTER — RECORDS - HEALTHEAST (OUTPATIENT)
Dept: ADMINISTRATIVE | Facility: CLINIC | Age: 36
End: 2021-05-26

## 2021-06-02 ENCOUNTER — RECORDS - HEALTHEAST (OUTPATIENT)
Dept: ADMINISTRATIVE | Facility: CLINIC | Age: 36
End: 2021-06-02

## 2021-06-03 ENCOUNTER — VIRTUAL VISIT (OUTPATIENT)
Dept: FAMILY MEDICINE | Facility: CLINIC | Age: 36
End: 2021-06-03
Payer: COMMERCIAL

## 2021-06-03 DIAGNOSIS — N30.00 ACUTE CYSTITIS WITHOUT HEMATURIA: Primary | ICD-10-CM

## 2021-06-03 DIAGNOSIS — B37.31 YEAST INFECTION OF THE VAGINA: ICD-10-CM

## 2021-06-03 PROCEDURE — 99213 OFFICE O/P EST LOW 20 MIN: CPT | Mod: 95 | Performed by: NURSE PRACTITIONER

## 2021-06-03 RX ORDER — SULFAMETHOXAZOLE/TRIMETHOPRIM 800-160 MG
1 TABLET ORAL 2 TIMES DAILY
Qty: 6 TABLET | Refills: 0 | Status: SHIPPED | OUTPATIENT
Start: 2021-06-03 | End: 2021-06-06

## 2021-06-03 RX ORDER — FLUCONAZOLE 150 MG/1
150 TABLET ORAL ONCE
Qty: 1 TABLET | Refills: 0 | Status: SHIPPED | OUTPATIENT
Start: 2021-06-03 | End: 2021-06-03

## 2021-06-03 NOTE — PROGRESS NOTES
Amisha is a 36 year old who is being evaluated via a billable video visit.      How would you like to obtain your AVS? MyChart  If the video visit is dropped, the invitation should be resent by: Text to cell phone: [phone]  Will anyone else be joining your video visit? No    Video Start Time: 2:02 PM    Assessment & Plan     (N30.00) Acute cystitis without hematuria  (primary encounter diagnosis)  Comment: Patient took home urine test was positive for infection will begin treatment did note work with patient that if not improving patient should follow-up with a clinic appointment or any worsening of symptoms  Plan: sulfamethoxazole-trimethoprim (BACTRIM DS)         800-160 MG tablet      (B37.3) Yeast infection of the vagina  Comment:   Plan: fluconazole (DIFLUCAN) 150 MG tablet              Return in about 1 week (around 6/10/2021), or if symptoms worsen or fail to improve.    ALEENA Bueno CNP  [facility]    Tita Lion is a 36 year old who presents for the following health issues     HPI     Genitourinary - Female  Onset/Duration: almost one week  Description:   Painful urination (Dysuria): YES           Frequency: YES  Blood in urine (Hematuria): no  Delay in urine (Hesitency): YES  Intensity: moderate  Progression of Symptoms:  worsening  Accompanying Signs & Symptoms:  Fever/chills: no  Flank pain: no  Nausea and vomiting: no  Vaginal symptoms: none  Abdominal/Pelvic Pain: YES  History:   History of frequent UTI s: no  History of kidney stones: no  Sexually Active: no  Possibility of pregnancy: No  Precipitating or alleviating factors: None  Therapies tried and outcome:  AZO          Review of Systems   Constitutional, HEENT, cardiovascular, pulmonary, gi and gu systems are negative, except as otherwise noted.      Objective           Vitals:  No vitals were obtained today due to virtual visit.    Physical Exam   GENERAL: Healthy, alert and no distress  EYES: Eyes  grossly normal to inspection.  No discharge or erythema, or obvious scleral/conjunctival abnormalities.  RESP: No audible wheeze, cough, or visible cyanosis.  No visible retractions or increased work of breathing.    SKIN: Visible skin clear. No significant rash, abnormal pigmentation or lesions.  NEURO: Cranial nerves grossly intact.  Mentation and speech appropriate for age.  PSYCH: Mentation appears normal, affect normal/bright, judgement and insight intact, normal speech and appearance well-groomed.    No results found for any visits on 06/03/21.            Video-Visit Details    Type of service:  Video Visit    Video End Time:2:14     Originating Location (pt. Location): Home    Distant Location (provider location):  [facility]     Platform used for Video Visit: [email or web address]

## 2021-06-23 ENCOUNTER — MYC REFILL (OUTPATIENT)
Dept: FAMILY MEDICINE | Facility: CLINIC | Age: 36
End: 2021-06-23

## 2021-06-23 DIAGNOSIS — F90.2 ATTENTION DEFICIT HYPERACTIVITY DISORDER (ADHD), COMBINED TYPE: ICD-10-CM

## 2021-06-24 NOTE — TELEPHONE ENCOUNTER
Requested Prescriptions   Pending Prescriptions Disp Refills     amphetamine-dextroamphetamine (ADDERALL) 5 MG tablet 60 tablet 0     Sig: Take 1 tablet (5 mg) by mouth 2 times daily       There is no refill protocol information for this order        Last Written Prescription Date:  5/24/21  Last Fill Quantity: 60,  # refills: 0   Last office visit: 10/18/2019 with prescribing provider:     Future Office Visit:

## 2021-06-25 RX ORDER — DEXTROAMPHETAMINE SACCHARATE, AMPHETAMINE ASPARTATE, DEXTROAMPHETAMINE SULFATE AND AMPHETAMINE SULFATE 1.25; 1.25; 1.25; 1.25 MG/1; MG/1; MG/1; MG/1
5 TABLET ORAL 2 TIMES DAILY
Qty: 60 TABLET | Refills: 0 | Status: SHIPPED | OUTPATIENT
Start: 2021-06-25 | End: 2021-09-23

## 2021-07-15 ENCOUNTER — VIRTUAL VISIT (OUTPATIENT)
Dept: FAMILY MEDICINE | Facility: CLINIC | Age: 36
End: 2021-07-15
Payer: COMMERCIAL

## 2021-07-15 DIAGNOSIS — F90.2 ATTENTION DEFICIT HYPERACTIVITY DISORDER (ADHD), COMBINED TYPE: Primary | ICD-10-CM

## 2021-07-15 PROCEDURE — 99213 OFFICE O/P EST LOW 20 MIN: CPT | Mod: 95 | Performed by: NURSE PRACTITIONER

## 2021-07-15 RX ORDER — DEXTROAMPHETAMINE SACCHARATE, AMPHETAMINE ASPARTATE MONOHYDRATE, DEXTROAMPHETAMINE SULFATE AND AMPHETAMINE SULFATE 5; 5; 5; 5 MG/1; MG/1; MG/1; MG/1
20 CAPSULE, EXTENDED RELEASE ORAL DAILY
Qty: 30 CAPSULE | Refills: 0 | Status: SHIPPED | OUTPATIENT
Start: 2021-09-15 | End: 2021-10-15

## 2021-07-15 RX ORDER — DEXTROAMPHETAMINE SACCHARATE, AMPHETAMINE ASPARTATE, DEXTROAMPHETAMINE SULFATE AND AMPHETAMINE SULFATE 2.5; 2.5; 2.5; 2.5 MG/1; MG/1; MG/1; MG/1
10 TABLET ORAL 2 TIMES DAILY
Qty: 60 TABLET | Refills: 0 | Status: SHIPPED | OUTPATIENT
Start: 2021-08-15 | End: 2021-09-14

## 2021-07-15 RX ORDER — DEXTROAMPHETAMINE SACCHARATE, AMPHETAMINE ASPARTATE MONOHYDRATE, DEXTROAMPHETAMINE SULFATE AND AMPHETAMINE SULFATE 5; 5; 5; 5 MG/1; MG/1; MG/1; MG/1
20 CAPSULE, EXTENDED RELEASE ORAL DAILY
Qty: 30 CAPSULE | Refills: 0 | Status: SHIPPED | OUTPATIENT
Start: 2021-07-15 | End: 2021-08-14

## 2021-07-15 RX ORDER — DEXTROAMPHETAMINE SACCHARATE, AMPHETAMINE ASPARTATE, DEXTROAMPHETAMINE SULFATE AND AMPHETAMINE SULFATE 2.5; 2.5; 2.5; 2.5 MG/1; MG/1; MG/1; MG/1
10 TABLET ORAL 2 TIMES DAILY
Qty: 60 TABLET | Refills: 0 | Status: SHIPPED | OUTPATIENT
Start: 2021-09-15 | End: 2021-10-15

## 2021-07-15 RX ORDER — DEXTROAMPHETAMINE SACCHARATE, AMPHETAMINE ASPARTATE MONOHYDRATE, DEXTROAMPHETAMINE SULFATE AND AMPHETAMINE SULFATE 5; 5; 5; 5 MG/1; MG/1; MG/1; MG/1
20 CAPSULE, EXTENDED RELEASE ORAL DAILY
Qty: 30 CAPSULE | Refills: 0 | Status: SHIPPED | OUTPATIENT
Start: 2021-08-15 | End: 2021-09-14

## 2021-07-15 RX ORDER — DEXTROAMPHETAMINE SACCHARATE, AMPHETAMINE ASPARTATE, DEXTROAMPHETAMINE SULFATE AND AMPHETAMINE SULFATE 2.5; 2.5; 2.5; 2.5 MG/1; MG/1; MG/1; MG/1
10 TABLET ORAL 2 TIMES DAILY
Qty: 60 TABLET | Refills: 0 | Status: SHIPPED | OUTPATIENT
Start: 2021-07-15 | End: 2021-08-14

## 2021-07-15 NOTE — PROGRESS NOTES
Amisha is a 36 year old who is being evaluated via a billable video visit.      How would you like to obtain your AVS? MyChart  If the video visit is dropped, the invitation should be resent by: Text to cell phone: [phone]  Will anyone else be joining your video visit? No    Video Start Time: 2:30 PM    Assessment & Plan     (F90.2) Attention deficit hyperactivity disorder (ADHD), combined type  (primary encounter diagnosis)  Comment:  Controlled no change in treatment plan  Plan: amphetamine-dextroamphetamine (ADDERALL) 10 MG         tablet, amphetamine-dextroamphetamine         (ADDERALL) 10 MG tablet,         amphetamine-dextroamphetamine (ADDERALL) 10 MG         tablet, amphetamine-dextroamphetamine (ADDERALL        XR) 20 MG 24 hr capsule,         amphetamine-dextroamphetamine (ADDERALL XR) 20         MG 24 hr capsule, amphetamine-dextroamphetamine        (ADDERALL XR) 20 MG 24 hr capsule      Return in about 3 months (around 10/15/2021) for using a video visit.    ALEENA Bueno [facility]    Subjective   Amisha is a 36 year old who presents for the following health issues     HPI     Medication Followup of Adderall    Taking Medication as prescribed: yes    Side Effects:  None    Medication Helping Symptoms:  yes         Review of Systems   Constitutional, HEENT, cardiovascular, pulmonary, gi and gu systems are negative, except as otherwise noted.      Objective           Vitals:  No vitals were obtained today due to virtual visit.    Physical Exam   GENERAL: Healthy, alert and no distress  EYES: Eyes grossly normal to inspection.  No discharge or erythema, or obvious scleral/conjunctival abnormalities.  RESP: No audible wheeze, cough, or visible cyanosis.  No visible retractions or increased work of breathing.    SKIN: Visible skin clear. No significant rash, abnormal pigmentation or lesions.  NEURO: Cranial nerves grossly intact.  Mentation and speech appropriate  for age.  PSYCH: Mentation appears normal, affect normal/bright, judgement and insight intact, normal speech and appearance well-groomed.    No results found for any visits on 07/15/21.          Video-Visit Details    Type of service:  Video Visit    Video End Time:2:38 PM    Originating Location (pt. Location): Home    Distant Location (provider location):  [facility]     Platform used for Video Visit: [employer]

## 2021-09-18 ENCOUNTER — HEALTH MAINTENANCE LETTER (OUTPATIENT)
Age: 36
End: 2021-09-18

## 2021-09-23 ENCOUNTER — VIRTUAL VISIT (OUTPATIENT)
Dept: FAMILY MEDICINE | Facility: CLINIC | Age: 36
End: 2021-09-23
Payer: COMMERCIAL

## 2021-09-23 VITALS — BODY MASS INDEX: 32.44 KG/M2 | WEIGHT: 189 LBS

## 2021-09-23 DIAGNOSIS — F90.0 ATTENTION DEFICIT HYPERACTIVITY DISORDER (ADHD), PREDOMINANTLY INATTENTIVE TYPE: Primary | ICD-10-CM

## 2021-09-23 DIAGNOSIS — G47.00 INSOMNIA, UNSPECIFIED TYPE: ICD-10-CM

## 2021-09-23 PROCEDURE — 99214 OFFICE O/P EST MOD 30 MIN: CPT | Mod: 95 | Performed by: NURSE PRACTITIONER

## 2021-09-23 RX ORDER — DEXTROAMPHETAMINE SACCHARATE, AMPHETAMINE ASPARTATE, DEXTROAMPHETAMINE SULFATE AND AMPHETAMINE SULFATE 2.5; 2.5; 2.5; 2.5 MG/1; MG/1; MG/1; MG/1
10 TABLET ORAL 2 TIMES DAILY
Qty: 60 TABLET | Refills: 0 | Status: SHIPPED | OUTPATIENT
Start: 2021-10-24 | End: 2021-11-23

## 2021-09-23 RX ORDER — TRAZODONE HYDROCHLORIDE 150 MG/1
TABLET ORAL
Qty: 135 TABLET | Refills: 3 | Status: SHIPPED | OUTPATIENT
Start: 2021-09-23 | End: 2021-10-05

## 2021-09-23 RX ORDER — DEXTROAMPHETAMINE SACCHARATE, AMPHETAMINE ASPARTATE MONOHYDRATE, DEXTROAMPHETAMINE SULFATE AND AMPHETAMINE SULFATE 5; 5; 5; 5 MG/1; MG/1; MG/1; MG/1
20 CAPSULE, EXTENDED RELEASE ORAL DAILY
Qty: 30 CAPSULE | Refills: 0 | Status: SHIPPED | OUTPATIENT
Start: 2021-09-23 | End: 2021-10-23

## 2021-09-23 RX ORDER — DEXTROAMPHETAMINE SACCHARATE, AMPHETAMINE ASPARTATE, DEXTROAMPHETAMINE SULFATE AND AMPHETAMINE SULFATE 2.5; 2.5; 2.5; 2.5 MG/1; MG/1; MG/1; MG/1
10 TABLET ORAL 2 TIMES DAILY
Qty: 60 TABLET | Refills: 0 | Status: SHIPPED | OUTPATIENT
Start: 2021-11-24 | End: 2021-12-24

## 2021-09-23 RX ORDER — DEXTROAMPHETAMINE SACCHARATE, AMPHETAMINE ASPARTATE MONOHYDRATE, DEXTROAMPHETAMINE SULFATE AND AMPHETAMINE SULFATE 5; 5; 5; 5 MG/1; MG/1; MG/1; MG/1
20 CAPSULE, EXTENDED RELEASE ORAL DAILY
Qty: 30 CAPSULE | Refills: 0 | Status: SHIPPED | OUTPATIENT
Start: 2021-10-24 | End: 2021-11-23

## 2021-09-23 RX ORDER — DEXTROAMPHETAMINE SACCHARATE, AMPHETAMINE ASPARTATE, DEXTROAMPHETAMINE SULFATE AND AMPHETAMINE SULFATE 2.5; 2.5; 2.5; 2.5 MG/1; MG/1; MG/1; MG/1
10 TABLET ORAL 2 TIMES DAILY
Qty: 60 TABLET | Refills: 0 | Status: SHIPPED | OUTPATIENT
Start: 2021-09-23 | End: 2021-10-23

## 2021-09-23 RX ORDER — DEXTROAMPHETAMINE SACCHARATE, AMPHETAMINE ASPARTATE MONOHYDRATE, DEXTROAMPHETAMINE SULFATE AND AMPHETAMINE SULFATE 5; 5; 5; 5 MG/1; MG/1; MG/1; MG/1
20 CAPSULE, EXTENDED RELEASE ORAL DAILY
Qty: 30 CAPSULE | Refills: 0 | Status: SHIPPED | OUTPATIENT
Start: 2021-11-24 | End: 2021-12-24

## 2021-09-23 NOTE — PROGRESS NOTES
Amisha is a 36 year old who is being evaluated via a billable video visit.      How would you like to obtain your AVS? MyChart  If the video visit is dropped, the invitation should be resent by: Text to cell phone: [phone]  Will anyone else be joining your video visit? No    Video Start Time: 8:18  AM    Assessment & Plan     (F90.0) Attention deficit hyperactivity disorder (ADHD), predominantly inattentive type  (primary encounter diagnosis)  Comment:  Controlled no change in treatment plan  Plan: amphetamine-dextroamphetamine (ADDERALL XR) 20         MG 24 hr capsule, amphetamine-dextroamphetamine        (ADDERALL XR) 20 MG 24 hr capsule,         amphetamine-dextroamphetamine (ADDERALL XR) 20         MG 24 hr capsule, amphetamine-dextroamphetamine        (ADDERALL) 10 MG tablet,         amphetamine-dextroamphetamine (ADDERALL) 10 MG         tablet, amphetamine-dextroamphetamine         (ADDERALL) 10 MG tablet      (G47.00) Insomnia, unspecified type  Comment:  Controlled no change in treatment plan  Plan: traZODone (DESYREL) 150 MG tablet      Return in about 3 months (around 12/23/2021) for using a video visit.    ALEENA Bueno CNP  [facility]    Subjective   Amisha is a 36 year old who presents for the following health issues     HPI     Medication Followup of ADHD and insomnia    Taking Medication as prescribed: yes    Side Effects:  None    Medication Helping Symptoms:  Yes staying on task and sleeping well         Review of Systems   Constitutional, HEENT, cardiovascular, pulmonary, gi and gu systems are negative, except as otherwise noted.      Objective           Vitals:  No vitals were obtained today due to virtual visit.    Physical Exam   GENERAL: Healthy, alert and no distress  EYES: Eyes grossly normal to inspection.  No discharge or erythema, or obvious scleral/conjunctival abnormalities.  RESP: No audible wheeze, cough, or visible cyanosis.  No visible  retractions or increased work of breathing.    SKIN: Visible skin clear. No significant rash, abnormal pigmentation or lesions.  NEURO: Cranial nerves grossly intact.  Mentation and speech appropriate for age.  PSYCH: Mentation appears normal, affect normal/bright, judgement and insight intact, normal speech and appearance well-groomed.              Video-Visit Details    Type of service:  Video Visit    Video End Time:8:24 AM    Originating Location (pt. Location): Home    Distant Location (provider location):  [facility]     Platform used for Video Visit: [employer]

## 2021-10-04 DIAGNOSIS — G47.00 INSOMNIA, UNSPECIFIED TYPE: ICD-10-CM

## 2021-10-05 RX ORDER — TRAZODONE HYDROCHLORIDE 150 MG/1
TABLET ORAL
Qty: 135 TABLET | Refills: 3 | Status: SHIPPED | OUTPATIENT
Start: 2021-10-05 | End: 2022-09-29

## 2022-01-11 ENCOUNTER — OFFICE VISIT (OUTPATIENT)
Dept: FAMILY MEDICINE | Facility: CLINIC | Age: 37
End: 2022-01-11
Payer: COMMERCIAL

## 2022-01-11 VITALS
SYSTOLIC BLOOD PRESSURE: 124 MMHG | RESPIRATION RATE: 16 BRPM | TEMPERATURE: 97.8 F | WEIGHT: 194 LBS | BODY MASS INDEX: 33.12 KG/M2 | HEART RATE: 88 BPM | HEIGHT: 64 IN | DIASTOLIC BLOOD PRESSURE: 70 MMHG

## 2022-01-11 DIAGNOSIS — M62.830 BACK MUSCLE SPASM: ICD-10-CM

## 2022-01-11 DIAGNOSIS — Z00.00 ROUTINE GENERAL MEDICAL EXAMINATION AT A HEALTH CARE FACILITY: Primary | ICD-10-CM

## 2022-01-11 DIAGNOSIS — F33.1 MAJOR DEPRESSIVE DISORDER, RECURRENT EPISODE, MODERATE (H): ICD-10-CM

## 2022-01-11 DIAGNOSIS — Z11.59 NEED FOR HEPATITIS C SCREENING TEST: ICD-10-CM

## 2022-01-11 DIAGNOSIS — F90.0 ATTENTION DEFICIT HYPERACTIVITY DISORDER (ADHD), PREDOMINANTLY INATTENTIVE TYPE: ICD-10-CM

## 2022-01-11 DIAGNOSIS — Z11.4 SCREENING FOR HIV (HUMAN IMMUNODEFICIENCY VIRUS): ICD-10-CM

## 2022-01-11 DIAGNOSIS — L30.9 ECZEMA, UNSPECIFIED TYPE: ICD-10-CM

## 2022-01-11 PROCEDURE — 90715 TDAP VACCINE 7 YRS/> IM: CPT | Performed by: NURSE PRACTITIONER

## 2022-01-11 PROCEDURE — G0124 SCREEN C/V THIN LAYER BY MD: HCPCS | Performed by: PATHOLOGY

## 2022-01-11 PROCEDURE — 99214 OFFICE O/P EST MOD 30 MIN: CPT | Mod: 25 | Performed by: NURSE PRACTITIONER

## 2022-01-11 PROCEDURE — 99395 PREV VISIT EST AGE 18-39: CPT | Mod: 25 | Performed by: NURSE PRACTITIONER

## 2022-01-11 PROCEDURE — G0145 SCR C/V CYTO,THINLAYER,RESCR: HCPCS | Performed by: NURSE PRACTITIONER

## 2022-01-11 PROCEDURE — 87624 HPV HI-RISK TYP POOLED RSLT: CPT | Performed by: NURSE PRACTITIONER

## 2022-01-11 PROCEDURE — 90471 IMMUNIZATION ADMIN: CPT | Performed by: NURSE PRACTITIONER

## 2022-01-11 RX ORDER — DEXTROAMPHETAMINE SACCHARATE, AMPHETAMINE ASPARTATE, DEXTROAMPHETAMINE SULFATE AND AMPHETAMINE SULFATE 2.5; 2.5; 2.5; 2.5 MG/1; MG/1; MG/1; MG/1
10 TABLET ORAL 2 TIMES DAILY
Qty: 60 TABLET | Refills: 0 | Status: SHIPPED | OUTPATIENT
Start: 2022-03-14 | End: 2022-04-13

## 2022-01-11 RX ORDER — TRIAMCINOLONE ACETONIDE 1 MG/G
CREAM TOPICAL 2 TIMES DAILY
Qty: 30 G | Refills: 0 | Status: SHIPPED | OUTPATIENT
Start: 2022-01-11 | End: 2023-07-07

## 2022-01-11 RX ORDER — DEXTROAMPHETAMINE SACCHARATE, AMPHETAMINE ASPARTATE, DEXTROAMPHETAMINE SULFATE AND AMPHETAMINE SULFATE 2.5; 2.5; 2.5; 2.5 MG/1; MG/1; MG/1; MG/1
10 TABLET ORAL 2 TIMES DAILY
Qty: 60 TABLET | Refills: 0 | Status: SHIPPED | OUTPATIENT
Start: 2022-01-11 | End: 2022-02-10

## 2022-01-11 RX ORDER — DEXTROAMPHETAMINE SACCHARATE, AMPHETAMINE ASPARTATE MONOHYDRATE, DEXTROAMPHETAMINE SULFATE AND AMPHETAMINE SULFATE 5; 5; 5; 5 MG/1; MG/1; MG/1; MG/1
20 CAPSULE, EXTENDED RELEASE ORAL DAILY
Qty: 30 CAPSULE | Refills: 0 | Status: SHIPPED | OUTPATIENT
Start: 2022-01-11 | End: 2022-02-10

## 2022-01-11 RX ORDER — DEXTROAMPHETAMINE SACCHARATE, AMPHETAMINE ASPARTATE, DEXTROAMPHETAMINE SULFATE AND AMPHETAMINE SULFATE 2.5; 2.5; 2.5; 2.5 MG/1; MG/1; MG/1; MG/1
10 TABLET ORAL 2 TIMES DAILY
Qty: 60 TABLET | Refills: 0 | Status: SHIPPED | OUTPATIENT
Start: 2022-02-11 | End: 2022-03-13

## 2022-01-11 RX ORDER — DEXTROAMPHETAMINE SACCHARATE, AMPHETAMINE ASPARTATE MONOHYDRATE, DEXTROAMPHETAMINE SULFATE AND AMPHETAMINE SULFATE 5; 5; 5; 5 MG/1; MG/1; MG/1; MG/1
20 CAPSULE, EXTENDED RELEASE ORAL DAILY
Qty: 30 CAPSULE | Refills: 0 | Status: SHIPPED | OUTPATIENT
Start: 2022-03-14 | End: 2022-04-12

## 2022-01-11 RX ORDER — CYCLOBENZAPRINE HCL 5 MG
5-10 TABLET ORAL 3 TIMES DAILY PRN
Qty: 60 TABLET | Refills: 0 | Status: SHIPPED | OUTPATIENT
Start: 2022-01-11 | End: 2022-04-12

## 2022-01-11 RX ORDER — DEXTROAMPHETAMINE SACCHARATE, AMPHETAMINE ASPARTATE MONOHYDRATE, DEXTROAMPHETAMINE SULFATE AND AMPHETAMINE SULFATE 5; 5; 5; 5 MG/1; MG/1; MG/1; MG/1
20 CAPSULE, EXTENDED RELEASE ORAL DAILY
Qty: 30 CAPSULE | Refills: 0 | Status: SHIPPED | OUTPATIENT
Start: 2022-02-11 | End: 2022-03-13

## 2022-01-11 ASSESSMENT — MIFFLIN-ST. JEOR: SCORE: 1549.98

## 2022-01-11 ASSESSMENT — PAIN SCALES - GENERAL: PAINLEVEL: NO PAIN (0)

## 2022-01-11 ASSESSMENT — ENCOUNTER SYMPTOMS
CHILLS: 0
DIARRHEA: 0
PARESTHESIAS: 0
JOINT SWELLING: 0
PALPITATIONS: 0
CONSTIPATION: 0
MYALGIAS: 1
WEAKNESS: 0
HEMATURIA: 0
FEVER: 0
NERVOUS/ANXIOUS: 0
FREQUENCY: 0
EYE PAIN: 0
HEARTBURN: 0
COUGH: 0
SHORTNESS OF BREATH: 0
SORE THROAT: 0
NAUSEA: 0
ABDOMINAL PAIN: 0
HEADACHES: 0
DYSURIA: 0
ARTHRALGIAS: 0
DIZZINESS: 0
HEMATOCHEZIA: 0
BREAST MASS: 0

## 2022-01-11 NOTE — PROGRESS NOTES
e4     SUBJECTIVE:   CC: Amisha Smith is an 36 year old woman who presents for preventive health visit.       Patient has been advised of split billing requirements and indicates understanding: Yes  Healthy Habits:     Getting at least 3 servings of Calcium per day:  NO    Bi-annual eye exam:  NO    Dental care twice a year:  NO    Sleep apnea or symptoms of sleep apnea:  None    Diet:  Regular (no restrictions)    Frequency of exercise:  1 day/week    Duration of exercise:  Less than 15 minutes    Taking medications regularly:  Yes    Medication side effects:  None    PHQ-2 Total Score: 0    Additional concerns today:  No        Today's PHQ-2 Score:   PHQ-2 ( 1999 [employer]) 1/11/2022   Q1: Little interest or pleasure in doing things 0   Q2: Feeling down, depressed or hopeless 0   PHQ-2 Score 0   PHQ-2 Total Score (12-17 Years)- Positive if 3 or more points; Administer PHQ-A if positive -   Q1: Little interest or pleasure in doing things Not at all   Q2: Feeling down, depressed or hopeless Not at all   PHQ-2 Score 0       Abuse: Current or Past (Physical, Sexual or Emotional) - No  Do you feel safe in your environment? Yes    Have you ever done Advance Care Planning? (For example, a Health Directive, POLST, or a discussion with a medical provider or your loved ones about your wishes): No, advance care planning information given to patient to review.  Patient declined advance care planning discussion at this time.    Social History     Tobacco Use     Smoking status: Former Smoker     Packs/day: 0.00     Types: Cigarettes     Smokeless tobacco: Never Used   Substance Use Topics     Alcohol use: Yes     Comment: rare     If you drink alcohol do you typically have >3 drinks per day or >7 drinks per week? No    Alcohol Use 1/11/2022   Prescreen: >3 drinks/day or >7 drinks/week? No       Reviewed orders with patient.  Reviewed health maintenance and updated orders accordingly - Yes  BP Readings from Last 3 Encounters:    01/11/22 124/70   10/18/19 110/66   04/13/18 117/72    Wt Readings from Last 3 Encounters:   01/11/22 88 kg (194 lb)   09/23/21 85.7 kg (189 lb)   10/18/19 88 kg (194 lb)                  Patient Active Problem List   Diagnosis     Mirena IUD- Placed 4/13/18     ADHD (attention deficit hyperactivity disorder), combined type     Attention deficit hyperactivity disorder (ADHD), predominantly inattentive type     History reviewed. No pertinent surgical history.    Social History     Tobacco Use     Smoking status: Former Smoker     Packs/day: 0.00     Types: Cigarettes     Smokeless tobacco: Never Used   Substance Use Topics     Alcohol use: Yes     Comment: rare     Family History   Problem Relation Age of Onset     Eye Disorder Maternal Grandfather      Cancer Maternal Grandfather          Current Outpatient Medications   Medication Sig Dispense Refill     amphetamine-dextroamphetamine (ADDERALL XR) 20 MG 24 hr capsule Take 1 capsule (20 mg) by mouth daily 30 capsule 0     traZODone (DESYREL) 150 MG tablet Take 1 and 1/2 tablets (total 225 mg) at bedtime  tablet 3     levonorgestrel (MIRENA) 20 MCG/24HR IUD 1 each (20 mcg) by Intrauterine route continuous       No Known Allergies  Recent Labs   Lab Test 10/18/19  0909   TSH 0.60        Breast Cancer Screening:    Breast CA Risk Assessment (FHS-7) 1/11/2022   Do you have a family history of breast, colon, or ovarian cancer? No / Unknown       click delete button to remove this line now  Patient under 40 years of age: Routine Mammogram Screening not recommended.   Pertinent mammograms are reviewed under the imaging tab.    History of abnormal Pap smear: NO - age 30- 65 PAP every 3 years recommended     Reviewed and updated as needed this visit by clinical staff                Reviewed and updated as needed this visit by Provider               History reviewed. No pertinent past medical history.   History reviewed. No pertinent surgical history.  OB History  "   Para Term  AB Living   2 0 0 0 0 2   SAB IAB Ectopic Multiple Live Births   0 0 0 0 2      # Outcome Date GA Lbr Charbel/2nd Weight Sex Delivery Anes PTL Lv   2             1                 Review of Systems   Constitutional: Negative for chills and fever.   HENT: Negative for congestion, ear pain, hearing loss and sore throat.    Eyes: Negative for pain and visual disturbance.   Respiratory: Negative for cough and shortness of breath.    Cardiovascular: Negative for chest pain, palpitations and peripheral edema.   Gastrointestinal: Negative for abdominal pain, constipation, diarrhea, heartburn, hematochezia and nausea.   Breasts:  Negative for tenderness, breast mass and discharge.   Genitourinary: Negative for dysuria, frequency, genital sores, hematuria, pelvic pain, urgency, vaginal bleeding and vaginal discharge.   Musculoskeletal: Positive for myalgias. Negative for arthralgias and joint swelling.   Skin: Negative for rash.   Neurological: Negative for dizziness, weakness, headaches and paresthesias.   Psychiatric/Behavioral: Negative for mood changes. The patient is not nervous/anxious.         OBJECTIVE:   /70 (BP Location: Right arm, Cuff Size: Adult Large)   Pulse 88   Temp 97.8  F (36.6  C) (Tympanic)   Resp 16   Ht 1.626 m (5' 4\")   Wt 88 kg (194 lb)   BMI 33.30 kg/m    Physical Exam  GENERAL: healthy, alert and no distress  EYES: Eyes grossly normal to inspection, PERRL and conjunctivae and sclerae normal  HENT: ear canals and TM's normal, nose and mouth without ulcers or lesions  NECK: no adenopathy, no asymmetry, masses, or scars and thyroid normal to palpation  RESP: lungs clear to auscultation - no rales, rhonchi or wheezes  BREAST: normal without masses, tenderness or nipple discharge and no palpable axillary masses or adenopathy  CV: regular rate and rhythm, normal S1 S2, no S3 or S4, no murmur, click or rub, no peripheral edema and peripheral pulses " strong  ABDOMEN: soft, nontender, no hepatosplenomegaly, no masses and bowel sounds normal   (female): normal female external genitalia, normal urethral meatus, vaginal mucosa pink, moist, well rugated, and normal cervix/adnexa/uterus without masses or discharge  MS: no gross musculoskeletal defects noted, no edema  SKIN: no suspicious lesions or rashes  NEURO: Normal strength and tone, mentation intact and speech normal  PSYCH: mentation appears normal, affect normal/bright    Diagnostic Test Results:  Labs reviewed in Epic  No results found for any visits on 01/11/22.    ASSESSMENT/PLAN:   (Z00.00) Routine general medical examination at a health care facility  (primary encounter diagnosis)  Comment:   Plan: Gynecologic Cytology (PAP), REVIEW OF HEALTH         MAINTENANCE PROTOCOL ORDERS            (Z11.4) Screening for HIV (human immunodeficiency virus)  Comment:   Plan:     (Z11.59) Need for hepatitis C screening test  Comment:   Plan: Hepatitis C Screen Reflex to HCV RNA Quant and         Genotype            (M62.830) Back muscle spasm  Comment: will try flexeril   Plan: cyclobenzaprine (FLEXERIL) 5 MG tablet,         OFFICE/OUTPT VISIT,EST,LEVL IV    (L30.9) Eczema, unspecified type  Comment:  Plan: triamcinolone (KENALOG) 0.1 % external cream,         OFFICE/OUTPT VISIT,EST,LEVL IV      (F90.0) Attention deficit hyperactivity disorder (ADHD), predominantly inattentive type  Comment:  Controlled no change in treatment plan  Plan: amphetamine-dextroamphetamine (ADDERALL XR) 20         MG 24 hr capsule, amphetamine-dextroamphetamine        (ADDERALL XR) 20 MG 24 hr capsule,         amphetamine-dextroamphetamine (ADDERALL XR) 20         MG 24 hr capsule, amphetamine-dextroamphetamine        (ADDERALL) 10 MG tablet,         amphetamine-dextroamphetamine (ADDERALL) 10 MG         tablet, amphetamine-dextroamphetamine         (ADDERALL) 10 MG tablet, OFFICE/OUTPT         VISIT,EST,LEVL IV    (F33.1) Major depressive  "disorder, recurrent episode, moderate (H)  Comment:  Controlled no change in treatment plan  Plan: OFFICE/OUTPT VISIT,BRENDAN MACEDO IV        Patient has been advised of split billing requirements and indicates understanding: Yes  COUNSELING:  Reviewed preventive health counseling, as reflected in patient instructions       Regular exercise       Healthy diet/nutrition       Vision screening       Hearing screening       Alcohol Use       Contraception       Family planning       Folic Acid       Safe sex practices/STD prevention    Estimated body mass index is 32.44 kg/m  as calculated from the following:    Height as of 10/18/19: 1.626 m (5' 4\").    Weight as of 9/23/21: 85.7 kg (189 lb).    Weight management plan: Discussed healthy diet and exercise guidelines    She reports that she has quit smoking. Her smoking use included cigarettes. She smoked 0.00 packs per day. She has never used smokeless tobacco.      Counseling Resources:  ATP IV Guidelines  Pooled Cohorts Equation Calculator  Breast Cancer Risk Calculator  BRCA-Related Cancer Risk Assessment: FHS-7 Tool  FRAX Risk Assessment  ICSI Preventive Guidelines  Dietary Guidelines for Americans, 2010  USDA's MyPlate  ASA Prophylaxis  Lung CA Screening    ALEENA Bueno [facility]  "

## 2022-01-11 NOTE — NURSING NOTE
Prior to immunization administration, verified patients identity using patient s name and date of birth. Please see Immunization Activity for additional information.     Screening Questionnaire for Adult Immunization    Are you sick today?   No   Do you have allergies to medications, food, a vaccine component or latex?   No   Have you ever had a serious reaction after receiving a vaccination?   No   Do you have a long-term health problem with heart, lung, kidney, or metabolic disease (e.g., diabetes), asthma, a blood disorder, no spleen, complement component deficiency, a cochlear implant, or a spinal fluid leak?  Are you on long-term aspirin therapy?   No   Do you have cancer, leukemia, HIV/AIDS, or any other immune system problem?   No   Do you have a parent, brother, or sister with an immune system problem?   No   In the past 3 months, have you taken medications that affect  your immune system, such as prednisone, other steroids, or anticancer drugs; drugs for the treatment of rheumatoid arthritis, Crohn s disease, or psoriasis; or have you had radiation treatments?   No   Have you had a seizure, or a brain or other nervous system problem?   No   During the past year, have you received a transfusion of blood or blood    products, or been given immune (gamma) globulin or antiviral drug?   No   For women: Are you pregnant or is there a chance you could become       pregnant during the next month?   No   Have you received any vaccinations in the past 4 weeks?   No     Immunization questionnaire answers were all negative.        Per orders of AFIA Fernandez, injection of tdap given by Saida Machado CMA. Patient instructed to remain in clinic for 15 minutes afterwards, and to report any adverse reaction to me immediately.       Screening performed by Saida Machado CMA on 1/11/2022 at 2:15 PM.

## 2022-01-14 LAB
BKR LAB AP GYN ADEQUACY: ABNORMAL
BKR LAB AP GYN INTERPRETATION: ABNORMAL
BKR LAB AP HPV REFLEX: ABNORMAL
BKR LAB AP PREVIOUS ABNORMAL: ABNORMAL
PATH REPORT.COMMENTS IMP SPEC: ABNORMAL
PATH REPORT.COMMENTS IMP SPEC: ABNORMAL
PATH REPORT.RELEVANT HX SPEC: ABNORMAL

## 2022-01-18 PROBLEM — R87.610 ASCUS OF CERVIX WITH NEGATIVE HIGH RISK HPV: Status: ACTIVE | Noted: 2022-01-11

## 2022-01-18 LAB
HUMAN PAPILLOMA VIRUS 16 DNA: NEGATIVE
HUMAN PAPILLOMA VIRUS 18 DNA: NEGATIVE
HUMAN PAPILLOMA VIRUS FINAL DIAGNOSIS: NORMAL
HUMAN PAPILLOMA VIRUS OTHER HR: NEGATIVE

## 2022-04-12 ENCOUNTER — VIRTUAL VISIT (OUTPATIENT)
Dept: FAMILY MEDICINE | Facility: CLINIC | Age: 37
End: 2022-04-12
Payer: COMMERCIAL

## 2022-04-12 DIAGNOSIS — F90.0 ATTENTION DEFICIT HYPERACTIVITY DISORDER (ADHD), PREDOMINANTLY INATTENTIVE TYPE: ICD-10-CM

## 2022-04-12 DIAGNOSIS — M67.431 GANGLION CYST OF WRIST, RIGHT: Primary | ICD-10-CM

## 2022-04-12 PROCEDURE — 99214 OFFICE O/P EST MOD 30 MIN: CPT | Mod: 95 | Performed by: NURSE PRACTITIONER

## 2022-04-12 RX ORDER — DEXTROAMPHETAMINE SACCHARATE, AMPHETAMINE ASPARTATE MONOHYDRATE, DEXTROAMPHETAMINE SULFATE AND AMPHETAMINE SULFATE 5; 5; 5; 5 MG/1; MG/1; MG/1; MG/1
20 CAPSULE, EXTENDED RELEASE ORAL DAILY
Qty: 30 CAPSULE | Refills: 0 | Status: SHIPPED | OUTPATIENT
Start: 2022-06-13 | End: 2022-07-13

## 2022-04-12 RX ORDER — DEXTROAMPHETAMINE SACCHARATE, AMPHETAMINE ASPARTATE MONOHYDRATE, DEXTROAMPHETAMINE SULFATE AND AMPHETAMINE SULFATE 5; 5; 5; 5 MG/1; MG/1; MG/1; MG/1
20 CAPSULE, EXTENDED RELEASE ORAL DAILY
Qty: 30 CAPSULE | Refills: 0 | Status: SHIPPED | OUTPATIENT
Start: 2022-04-12 | End: 2022-04-29

## 2022-04-12 RX ORDER — DEXTROAMPHETAMINE SACCHARATE, AMPHETAMINE ASPARTATE, DEXTROAMPHETAMINE SULFATE AND AMPHETAMINE SULFATE 2.5; 2.5; 2.5; 2.5 MG/1; MG/1; MG/1; MG/1
10 TABLET ORAL 2 TIMES DAILY
Qty: 60 TABLET | Refills: 0 | Status: SHIPPED | OUTPATIENT
Start: 2022-06-13 | End: 2022-04-29

## 2022-04-12 RX ORDER — DEXTROAMPHETAMINE SACCHARATE, AMPHETAMINE ASPARTATE, DEXTROAMPHETAMINE SULFATE AND AMPHETAMINE SULFATE 2.5; 2.5; 2.5; 2.5 MG/1; MG/1; MG/1; MG/1
10 TABLET ORAL 2 TIMES DAILY
Qty: 60 TABLET | Refills: 0 | Status: SHIPPED | OUTPATIENT
Start: 2022-04-12 | End: 2022-05-12

## 2022-04-12 RX ORDER — DEXTROAMPHETAMINE SACCHARATE, AMPHETAMINE ASPARTATE, DEXTROAMPHETAMINE SULFATE AND AMPHETAMINE SULFATE 2.5; 2.5; 2.5; 2.5 MG/1; MG/1; MG/1; MG/1
10 TABLET ORAL 2 TIMES DAILY
Qty: 60 TABLET | Refills: 0 | Status: SHIPPED | OUTPATIENT
Start: 2022-05-13 | End: 2022-06-12

## 2022-04-12 RX ORDER — DEXTROAMPHETAMINE SACCHARATE, AMPHETAMINE ASPARTATE MONOHYDRATE, DEXTROAMPHETAMINE SULFATE AND AMPHETAMINE SULFATE 5; 5; 5; 5 MG/1; MG/1; MG/1; MG/1
20 CAPSULE, EXTENDED RELEASE ORAL DAILY
Qty: 30 CAPSULE | Refills: 0 | Status: SHIPPED | OUTPATIENT
Start: 2022-05-13 | End: 2022-06-12

## 2022-04-12 NOTE — LETTER
My Depression Action Plan  Name: Amisha Smith   Date of Birth 1985  Date: 4/12/2022    My doctor: Sara Chase   My clinic: [address] [id number] [phone]          GREEN    ZONE   Good Control    What it looks like:     Things are going generally well. You have normal ups and downs. You may even feel depressed from time to time, but bad moods usually last less than a day.   What you need to do:  1. Continue to care for yourself (see self care plan)  2. Check your depression survival kit and update it as needed  3. Follow your physician s recommendations including any medication.  4. Do not stop taking medication unless you consult with your physician first.           YELLOW         ZONE Getting Worse    What it looks like:     Depression is starting to interfere with your life.     It may be hard to get out of bed; you may be starting to isolate yourself from others.    Symptoms of depression are starting to last most all day and this has happened for several days.     You may have suicidal thoughts but they are not constant.   What you need to do:     1. Call your care team. Your response to treatment will improve if you keep your care team informed of your progress. Yellow periods are signs an adjustment may need to be made.     2. Continue your self-care.  Just get dressed and ready for the day.  Don't give yourself time to talk yourself out of it.    3. Talk to someone in your support network.    4. Open up your Depression Self-Care Plan/Wellness Kit.           RED    ZONE Medical Alert - Get Help    What it looks like:     Depression is seriously interfering with your life.     You may experience these or other symptoms: You can t get out of bed most days, can t work or engage in other necessary activities, you have trouble taking care of basic hygiene, or basic responsibilities, thoughts of suicide or death that will not go  away, self-injurious behavior.     What you need to do:  1. Call your care team and request a same-day appointment. If they are not available (weekends or after hours) call your local crisis line, emergency room or 911.          Depression Self-Care Plan / Wellness Kit    Many people find that medication and therapy are helpful treatments for managing depression. In addition, making small changes to your everyday life can help to boost your mood and improve your wellbeing. Below are some tips for you to consider. Be sure to talk with your medical provider and/or behavioral health consultant if your symptoms are worsening or not improving.     Sleep   Sleep hygiene  means all of the habits that support good, restful sleep. It includes maintaining a consistent bedtime and wake time, using your bedroom only for sleeping or sex, and keeping the bedroom dark and free of distractions like a computer, smartphone, or television.     Develop a Healthy Routine  Maintain good hygiene. Get out of bed in the morning, make your bed, brush your teeth, take a shower, and get dressed. Don t spend too much time viewing media that makes you feel stressed. Find time to relax each day.    Exercise  Get some form of exercise every day. This will help reduce pain and release endorphins, the  feel good  chemicals in your brain. It can be as simple as just going for a walk or doing some gardening, anything that will get you moving.      Diet  Strive to eat healthy foods, including fruits and vegetables. Drink plenty of water. Avoid excessive sugar, caffeine, alcohol, and other mood-altering substances.     Stay Connected with Others  Stay in touch with friends and family members.    Manage Your Mood  Try deep breathing, massage therapy, biofeedback, or meditation. Take part in fun activities when you can. Try to find something to smile about each day.     Psychotherapy  Be open to working with a therapist if your provider recommends it.      Medication  Be sure to take your medication as prescribed. Most anti-depressants need to be taken every day. It usually takes several weeks for medications to work. Not all medicines work for all people. It is important to follow-up with your provider to make sure you have a treatment plan that is working for you. Do not stop your medication abruptly without first discussing it with your provider.    Crisis Resources   These hotlines are for both adults and children. They and are open 24 hours a day, 7 days a week unless noted otherwise.      National Suicide Prevention Lifeline   9-439-556-TALK (9118)      Crisis Text Line    www.crisistextline.org  Text HOME to 157728 from anywhere in the [place], anytime, about any type of crisis. A live, trained crisis counselor will receive the text and respond quickly.      [facility] Lifeline for LGBTQ Youth  A national crisis intervention and suicide lifeline for LGBTQ youth under 25. Provides a safe place to talk without judgement. Call 1-634.210.8744; text START to 672937 or visit www.thetrevorproject.org to talk to a trained counselor.      For [facility] crisis numbers, visit the [facility] website at:  https://[place].gov/dhs/people-we-serve/adults/health-care/mental-health/resources/crisis-contacts.jsp

## 2022-04-12 NOTE — PROGRESS NOTES
Amisha is a 37 year old who is being evaluated via a billable video visit.      How would you like to obtain your AVS? MyChart  If the video visit is dropped, the invitation should be resent by: Text to cell phone: [phone]  Will anyone else be joining your video visit? No      Video Start Time: 10:16 AM    Assessment & Plan     (M67.431) Ganglion cyst of wrist, right  (primary encounter diagnosis)  Comment: Will obtain ultrasound   Plan: US Upper Extremity Non Vascular Right    (F90.0) Attention deficit hyperactivity disorder (ADHD), predominantly inattentive type  Comment:  Controlled no change in treatment plan  Plan: amphetamine-dextroamphetamine (ADDERALL XR) 20         MG 24 hr capsule, amphetamine-dextroamphetamine        (ADDERALL XR) 20 MG 24 hr capsule,         amphetamine-dextroamphetamine (ADDERALL XR) 20         MG 24 hr capsule, amphetamine-dextroamphetamine        (ADDERALL) 10 MG tablet,         amphetamine-dextroamphetamine (ADDERALL) 10 MG         tablet, amphetamine-dextroamphetamine         (ADDERALL) 10 MG tablet             No follow-ups on file.    ALEENA Bueno [facility]    Subjective   Amisha is a 37 year old who presents for the following health issues     History of Present Illness       Reason for visit:  Medication refillShe consumes 1 sweetened beverage(s) daily.She exercises with enough effort to increase her heart rate 20 to 29 minutes per day.  She exercises with enough effort to increase her heart rate 3 or less days per week.   She is taking medications regularly.       Medication Followup of Adderall    Taking Medication as prescribed: yes    Side Effects:  None    Medication Helping Symptoms:  yes         Review of Systems   Constitutional, HEENT, cardiovascular, pulmonary, gi and gu systems are negative, except as otherwise noted.      Objective           Vitals:  No vitals were obtained today due to virtual visit.    Physical Exam    GENERAL: Healthy, alert and no distress  EYES: Eyes grossly normal to inspection.  No discharge or erythema, or obvious scleral/conjunctival abnormalities.  RESP: No audible wheeze, cough, or visible cyanosis.  No visible retractions or increased work of breathing.    SKIN: Visible skin clear. No significant rash, abnormal pigmentation cyst to right wrist NEURO: Cranial nerves grossly intact.  Mentation and speech appropriate for age.  PSYCH: Mentation appears normal, affect normal/bright, judgement and insight intact, normal speech and appearance well-groomed.    No results found for any visits on 04/12/22.            Video-Visit Details    Type of service:  Video Visit    Video End Time:    Originating Location (pt. Location): Home    Distant Location (provider location):  [facility]     Platform used for Video Visit: [employer]

## 2022-04-29 ENCOUNTER — HOSPITAL ENCOUNTER (EMERGENCY)
Facility: CLINIC | Age: 37
Discharge: HOME | End: 2022-04-29
Attending: PHYSICIAN ASSISTANT | Admitting: PHYSICIAN ASSISTANT
Payer: COMMERCIAL

## 2022-04-29 ENCOUNTER — APPOINTMENT (OUTPATIENT)
Dept: GENERAL RADIOLOGY | Facility: CLINIC | Age: 37
End: 2022-04-29
Attending: PHYSICIAN ASSISTANT
Payer: COMMERCIAL

## 2022-04-29 VITALS
DIASTOLIC BLOOD PRESSURE: 78 MMHG | TEMPERATURE: 97.4 F | HEART RATE: 78 BPM | OXYGEN SATURATION: 99 % | WEIGHT: 197 LBS | RESPIRATION RATE: 16 BRPM | BODY MASS INDEX: 33.81 KG/M2 | SYSTOLIC BLOOD PRESSURE: 115 MMHG

## 2022-04-29 DIAGNOSIS — M54.6 LEFT-SIDED THORACIC BACK PAIN: ICD-10-CM

## 2022-04-29 DIAGNOSIS — S13.9XXA SPRAIN OF NECK, INITIAL ENCOUNTER: ICD-10-CM

## 2022-04-29 LAB
FLUAV RNA SPEC QL NAA+PROBE: NEGATIVE
FLUBV RNA RESP QL NAA+PROBE: NEGATIVE
SARS-COV-2 RNA RESP QL NAA+PROBE: NEGATIVE

## 2022-04-29 PROCEDURE — 71101 X-RAY EXAM UNILAT RIBS/CHEST: CPT | Mod: LT

## 2022-04-29 PROCEDURE — 87636 SARSCOV2 & INF A&B AMP PRB: CPT | Performed by: PHYSICIAN ASSISTANT

## 2022-04-29 PROCEDURE — C9803 HOPD COVID-19 SPEC COLLECT: HCPCS | Performed by: PHYSICIAN ASSISTANT

## 2022-04-29 PROCEDURE — G0463 HOSPITAL OUTPT CLINIC VISIT: HCPCS | Mod: 25 | Performed by: PHYSICIAN ASSISTANT

## 2022-04-29 PROCEDURE — 99213 OFFICE O/P EST LOW 20 MIN: CPT | Performed by: PHYSICIAN ASSISTANT

## 2022-04-29 RX ORDER — BENZONATATE 100 MG/1
100 CAPSULE ORAL 3 TIMES DAILY PRN
Status: DISCONTINUED | OUTPATIENT
Start: 2022-04-29 | End: 2022-04-29

## 2022-04-29 RX ORDER — BENZONATATE 100 MG/1
100-200 CAPSULE ORAL 3 TIMES DAILY PRN
Qty: 42 CAPSULE | Refills: 0 | Status: SHIPPED | OUTPATIENT
Start: 2022-04-29 | End: 2022-05-29

## 2022-04-29 RX ORDER — CYCLOBENZAPRINE HCL 10 MG
10 TABLET ORAL 3 TIMES DAILY PRN
Qty: 40 TABLET | Refills: 3 | Status: SHIPPED | OUTPATIENT
Start: 2022-04-29 | End: 2023-01-03

## 2022-04-29 NOTE — TELEPHONE ENCOUNTER
Pt is looking to get a new refill on Cyclobenzaprine, Ekta Verma prescribed this for pt on 1/11/2022 for back spasms that pt was having. At her last appointment on 4/12 it was taken off pt's med list because she was no longer taking it, but pt's back spasms started again on 4/27. So she is wondering if it can be re-prescribed. She tried icing it and taking over the counter pain medicine, but nothing has helped.

## 2022-04-29 NOTE — ED PROVIDER NOTES
History     Chief Complaint   Patient presents with     Rib Pain     Right side, woke up with, no known injury, recent URI     HPI  Amisha Smith is a 37 year old female who presents to the urgent care with concern over left-sided rib/chest pain which has been present for the last 3 days.  Patient denies any clear instigating injury or trauma states that she awoke with pain from sleeping.  She describes sharp pain which is exacerbated by movement of her torso, deep breathing.  She denies any actual shortness of breath, wheezing.   She has had URI symptoms including nasal congestion, cough which developed around the same time and complains of lightheadedness, mental confusion/fogginess.  She also reports that she had the same URI symptoms last weekend with vomiting which seemed to resolve spontaneously.  She has not had any fever, chills, myalgias, diarrhea, abdominal pain, melena, hematochezia, urinary complaints.  She has attempted to treat with multiple over-the-counter medication including Tylenol, ibuprofen, topical lidocaine patches without relief.  She did put a call into primary care provider earlier this week requesting refill of Flexeril which was prescribed.  She denies any known exposures to COVID-19.  She reports that she does get tested weekly for work last had a negative test, last Tuesday per her report. She has not been vaccinated for COVID-19 and is adamant that she will not be vaccinated.  She denies any personal or family history of bleeding or clotting disorders.  No recent surgeries, significant trauma or prolonged periods of inactivity.  No known cancer history.  She is not on estrogen therapies for birth control.      Allergies:  No Known Allergies    Problem List:    Patient Active Problem List    Diagnosis Date Noted     Major depressive disorder, recurrent episode, moderate (H) 01/11/2022     Priority: Medium     ASCUS of cervix with negative high risk HPV 01/11/2022     Priority: Medium      2006, 2007, 2009, 2012, 2017 NIL paps  1/11/22 ASCUS pap , Neg HPV. Plan cotest in 3 years.   1/18/22 Result letter sent via [employer]. Pt read results.         ADHD (attention deficit hyperactivity disorder), combined type 03/05/2021     Priority: Medium     Attention deficit hyperactivity disorder (ADHD), predominantly inattentive type 03/05/2021     Priority: Medium     Mirena IUD- Placed 4/13/18 04/13/2018     Priority: Medium     Lot: [id number]  Exp: 10/2020      Shaunna Fagan LPN          Past Medical History:    Past Medical History:   Diagnosis Date     Abnormal Pap smear of cervix 01/11/2022     Past Surgical History:    No past surgical history on file.    Family History:    Family History   Problem Relation Age of Onset     Eye Disorder Maternal Grandfather      Cancer Maternal Grandfather      Social History:  Marital Status:  Single [1]  Social History     Tobacco Use     Smoking status: Former Smoker     Packs/day: 0.00     Types: Cigarettes     Smokeless tobacco: Never Used   Vaping Use     Vaping Use: Never used   Substance Use Topics     Alcohol use: Yes     Comment: rare     Drug use: No      Medications:    [START ON 5/13/2022] amphetamine-dextroamphetamine (ADDERALL XR) 20 MG 24 hr capsule  [START ON 6/13/2022] amphetamine-dextroamphetamine (ADDERALL XR) 20 MG 24 hr capsule  amphetamine-dextroamphetamine (ADDERALL) 10 MG tablet  [START ON 5/13/2022] amphetamine-dextroamphetamine (ADDERALL) 10 MG tablet  cyclobenzaprine (FLEXERIL) 10 MG tablet  levonorgestrel (MIRENA) 20 MCG/24HR IUD  traZODone (DESYREL) 150 MG tablet  triamcinolone (KENALOG) 0.1 % external cream      Review of Systems  CONSTITUTIONAL:NEGATIVE for fever, chills, change in weight  INTEGUMENTARY/SKIN: NEGATIVE for worrisome rashes, moles or lesions  EYES: NEGATIVE for vision changes or irritation  ENT/MOUTH: \POSITIVE for nasal congestion   RESP:POSITIVE for cough and NEGATIVE for SOB/dyspnea and wheezing  GI: NEGATIVE for  abdominal pain, diarrhea, nausea and vomiting  : NEGATIVE For dysuria, increased frequency   MUSCULOSKELETAL: POSITIVE  for left sided rib/chest wall pain and NEGATIVE for other concerning arthralgias or myalgias   NEURO: NEGATIVE for numbness, paresthesias   Physical Exam   BP: 115/78  Pulse: 78  Temp: 97.4  F (36.3  C)  Resp: 16  Weight: 89.4 kg (197 lb)  SpO2: 99 %  Physical Exam  Constitutional:       General: She is in acute distress (mild, patinet appears in pain).      Appearance: She is not ill-appearing or toxic-appearing.   HENT:      Head: Normocephalic and atraumatic.   Cardiovascular:      Rate and Rhythm: Normal rate and regular rhythm.      Heart sounds: No murmur heard.    No friction rub. No gallop.   Pulmonary:      Breath sounds: No wheezing, rhonchi or rales.      Comments: decreased breath sounds throughout  Abdominal:      General: Bowel sounds are normal. There is no distension.      Palpations: Abdomen is soft.      Tenderness: There is no abdominal tenderness. There is no right CVA tenderness, left CVA tenderness or guarding.   Musculoskeletal:      Cervical back: No spasms or tenderness. Normal range of motion.      Thoracic back: Tenderness present. No swelling, deformity or lacerations. Decreased range of motion (secondary to pain).      Lumbar back: No tenderness.        Back:    Skin:     General: Skin is warm and dry.      Findings: No abrasion, ecchymosis, erythema, laceration or wound.   Neurological:      Mental Status: She is alert.      GCS: GCS eye subscore is 4. GCS verbal subscore is 5. GCS motor subscore is 6.      Sensory: No sensory deficit.         ED Course           Procedures       Critical Care time:  none          Results for orders placed or performed during the hospital encounter of 04/29/22   Ribs XR, unilat 3 views + PA chest,  left     Status: None    Narrative    XR RIBS & CHEST LT 3VW 4/29/2022 2:47 PM    HISTORY: pain, no clear history of trauma, recent URI  symptoms    COMPARISON: 11/28/2000    FINDINGS/    Impression    IMPRESSION: Negative chest. No displaced rib fractures.    DELMAR PENALOZA MD         SYSTEM ID:  [id number]   Symptomatic; Unknown Influenza A/B & SARS-CoV2 (COVID-19) Virus PCR Multiplex Nasopharyngeal     Status: Normal    Specimen: Nasopharyngeal; Swab   Result Value Ref Range    Influenza A PCR Negative Negative    Influenza B PCR Negative Negative    SARS CoV2 PCR Negative Negative    Narrative    Testing was performed using the meka SARS-CoV-2 & Influenza A/B Assay on the meka Claudia System. This test should be ordered for the detection of SARS-CoV-2 and influenza viruses in individuals who meet clinical and/or epidemiological criteria. Test performance is unknown in asymptomatic patients. This test is for in vitro diagnostic use under the FDA EUA for laboratories certified under CLIA to perform moderate and/or high complexity testing. This test has not been FDA cleared or approved. A negative result does not rule out the presence of PCR inhibitors in the specimen or target RNA in concentration below the limit of detection for the assay. If only one viral target is positive but coinfection with multiple targets is suspected, the sample should be re-tested with another FDA cleared, approved or authorized test, if coinfection would change clinical management. [facility] are certified under the Clinical Laboratory Improvement Amendments of 1988 (CLIA-88) as  qualified to perform moderate and/or high complexity laboratory testing.       Medications - No data to display    Assessments & Plan (with Medical Decision Making)     I have reviewed the nursing notes.    I have reviewed the findings, diagnosis, plan and need for follow up with the patient.       PERC Rule for risk stratifying PE to low risk (calculator)  Background  Risk stratifies patients to low risk of PE if all 8 criteria are present including age <50, heart rate  <100, O2 Sat >94%, no unilateral leg edema, no hemoptysis, no recent surgery or trauma, no prior VTE, and no hormone use.  Data  37 year old  has Mirena IUD- Placed 4/13/18; ADHD (attention deficit hyperactivity disorder), combined type; Attention deficit hyperactivity disorder (ADHD), predominantly inattentive type; Major depressive disorder, recurrent episode, moderate (H); and ASCUS of cervix with negative high risk HPV on their problem list.  @cmedp@   has no past surgical history on file.  Pulse: 78  SpO2: 99 %  Criteria   Of  8 possible items (all criteria must be present):  Age <50 years  Heart rate <100 bpm  Oxygen Saturation >94%  No unilateral leg swelling  No hemoptysis  No surgery or trauma within 4 weeks  No prior DVT or PE  No hormone use (oral, transdermal and intravaginal estrogens)  Interpretation  All eight criteria are met AND low clinical PE suspicion: No further evaluation for PE required    Discharge Medication List as of 4/29/2022  3:14 PM      START taking these medications    Details   benzonatate (TESSALON) 100 MG capsule Take 1-2 capsules (100-200 mg) by mouth 3 times daily as needed, Disp-42 capsule, R-0, E-Prescribe      cyclobenzaprine (FLEXERIL) 10 MG tablet Take 1 tablet (10 mg) by mouth 3 times daily as needed for muscle spasms, Disp-40 tablet, R-3, E-Prescribe           Final diagnoses:   Left-sided thoracic back pain     29-year-old female presents to urgent care with concerns over left-sided thoracic rib/back pain developed without instigating injury or trauma in context of recent URI symptoms.  Physical examination as described above were significant for reproducible tenderness palpation in the lateral aspect of her ribs/thoracic region, no bony midline tenderness to palpation.  Pain is reproducible with movement, palpation.  Further evaluation she did have chest x-ray which was negative for acute abnormality.  Specifically there was no signs of rib fracture, pneumonia,  pneumothorax, pleural effusion.  She had negative influenza, COVID-19 testing.  Differential for her symptoms would include intercostal strain from coughing, pleurisy from viral URI.  I have low suspicion for PE and she does stratify to low risk using PERC criteria however, I did discuss risk/benefits of obtaining D dimer and patient elected to defer.  Discharged home stable with prescription for Tessalon to be used as needed for cough control.  Follow-up with primary care provider if no improvement of her symptoms within the next 3 days.  Worrisome reasons to return to the ER/[facility] including development of shortness of breath, chest pains, palpitations, worsening dizziness/lightheadedness, development of fever or other new or worsening symptoms discussed.    Disclaimer: This note consists of symbols derived from keyboarding, dictation, and/or voice recognition software. As a result, there may be errors in the script that have gone undetected.  Please consider this when interpreting information found in the chart.    4/29/2022   [facility] EMERGENCY DEPT     Melvina Franklin PA-C  04/30/22 [id number]

## 2022-04-29 NOTE — ED TRIAGE NOTES
Right side, woke up with a few days ago, no known injury, recent URI     Triage Assessment     Row Name 04/29/22 [id number]       Triage Assessment (Adult)    Airway WDL WDL       Respiratory WDL    Respiratory WDL WDL       Skin Circulation/Temperature WDL    Skin Circulation/Temperature WDL WDL       Peripheral/Neurovascular WDL    Peripheral Neurovascular WDL WDL       Cognitive/Neuro/Behavioral WDL    Cognitive/Neuro/Behavioral WDL WDL

## 2022-07-07 ENCOUNTER — VIRTUAL VISIT (OUTPATIENT)
Dept: FAMILY MEDICINE | Facility: CLINIC | Age: 37
End: 2022-07-07
Payer: COMMERCIAL

## 2022-07-07 DIAGNOSIS — F90.0 ATTENTION DEFICIT HYPERACTIVITY DISORDER (ADHD), PREDOMINANTLY INATTENTIVE TYPE: Primary | ICD-10-CM

## 2022-07-07 PROCEDURE — 99213 OFFICE O/P EST LOW 20 MIN: CPT | Mod: 95 | Performed by: NURSE PRACTITIONER

## 2022-07-07 RX ORDER — DEXTROAMPHETAMINE SACCHARATE, AMPHETAMINE ASPARTATE, DEXTROAMPHETAMINE SULFATE AND AMPHETAMINE SULFATE 2.5; 2.5; 2.5; 2.5 MG/1; MG/1; MG/1; MG/1
10 TABLET ORAL 2 TIMES DAILY
Qty: 60 TABLET | Refills: 0 | Status: SHIPPED | OUTPATIENT
Start: 2022-08-07 | End: 2022-09-06

## 2022-07-07 RX ORDER — DEXTROAMPHETAMINE SACCHARATE, AMPHETAMINE ASPARTATE MONOHYDRATE, DEXTROAMPHETAMINE SULFATE AND AMPHETAMINE SULFATE 5; 5; 5; 5 MG/1; MG/1; MG/1; MG/1
20 CAPSULE, EXTENDED RELEASE ORAL DAILY
Qty: 30 CAPSULE | Refills: 0 | Status: SHIPPED | OUTPATIENT
Start: 2022-08-07 | End: 2022-09-06

## 2022-07-07 RX ORDER — DEXTROAMPHETAMINE SACCHARATE, AMPHETAMINE ASPARTATE, DEXTROAMPHETAMINE SULFATE AND AMPHETAMINE SULFATE 2.5; 2.5; 2.5; 2.5 MG/1; MG/1; MG/1; MG/1
10 TABLET ORAL 2 TIMES DAILY
Qty: 60 TABLET | Refills: 0 | Status: SHIPPED | OUTPATIENT
Start: 2022-07-07 | End: 2022-08-06

## 2022-07-07 RX ORDER — DEXTROAMPHETAMINE SACCHARATE, AMPHETAMINE ASPARTATE MONOHYDRATE, DEXTROAMPHETAMINE SULFATE AND AMPHETAMINE SULFATE 5; 5; 5; 5 MG/1; MG/1; MG/1; MG/1
20 CAPSULE, EXTENDED RELEASE ORAL DAILY
Qty: 30 CAPSULE | Refills: 0 | Status: SHIPPED | OUTPATIENT
Start: 2022-09-07 | End: 2022-10-07

## 2022-07-07 RX ORDER — DEXTROAMPHETAMINE SACCHARATE, AMPHETAMINE ASPARTATE, DEXTROAMPHETAMINE SULFATE AND AMPHETAMINE SULFATE 2.5; 2.5; 2.5; 2.5 MG/1; MG/1; MG/1; MG/1
10 TABLET ORAL 2 TIMES DAILY
Qty: 60 TABLET | Refills: 0 | Status: SHIPPED | OUTPATIENT
Start: 2022-09-07 | End: 2022-10-07

## 2022-07-07 RX ORDER — DEXTROAMPHETAMINE SACCHARATE, AMPHETAMINE ASPARTATE MONOHYDRATE, DEXTROAMPHETAMINE SULFATE AND AMPHETAMINE SULFATE 5; 5; 5; 5 MG/1; MG/1; MG/1; MG/1
20 CAPSULE, EXTENDED RELEASE ORAL DAILY
Qty: 30 CAPSULE | Refills: 0 | Status: SHIPPED | OUTPATIENT
Start: 2022-07-07 | End: 2022-08-06

## 2022-07-07 NOTE — PROGRESS NOTES
"Amisha is a 37 year old who is being evaluated via a billable video visit.      How would you like to obtain your AVS? MyChart  If the video visit is dropped, the invitation should be resent by: Text to cell phone: [phone]  Will anyone else be joining your video visit? No          Assessment & Plan     (F90.0) Attention deficit hyperactivity disorder (ADHD), predominantly inattentive type  (primary encounter diagnosis)  Comment:  Controlled no change in treatment plan  Plan: amphetamine-dextroamphetamine (ADDERALL XR) 20         MG 24 hr capsule, amphetamine-dextroamphetamine        (ADDERALL XR) 20 MG 24 hr capsule,         amphetamine-dextroamphetamine (ADDERALL XR) 20         MG 24 hr capsule, amphetamine-dextroamphetamine        (ADDERALL) 10 MG tablet,         amphetamine-dextroamphetamine (ADDERALL) 10 MG         tablet, amphetamine-dextroamphetamine         (ADDERALL) 10 MG tablet    BMI:   Estimated body mass index is 33.81 kg/m  as calculated from the following:    Height as of 1/11/22: 1.626 m (5' 4\").    Weight as of 4/29/22: 89.4 kg (197 lb).   Weight management plan: Discussed healthy diet and exercise guidelines    See Patient Instructions    No follow-ups on file.    ALEENA Bueno CNP  [facility]    Tita Lion is a 37 year old, presenting for the following health issues:  A.D.H.D      History of Present Illness       Reason for visit:  Medication renewal    She eats 2-3 servings of fruits and vegetables daily.She consumes 2 sweetened beverage(s) daily.She exercises with enough effort to increase her heart rate 20 to 29 minutes per day.  She exercises with enough effort to increase her heart rate 4 days per week.   She is taking medications regularly.       Medication Followup of Adderall    Taking Medication as prescribed: yes    Side Effects:  None    Medication Helping Symptoms:  yes        Review of Systems   Constitutional, HEENT, cardiovascular, " pulmonary, gi and gu systems are negative, except as otherwise noted.      Objective           Vitals:  No vitals were obtained today due to virtual visit.    Physical Exam   GENERAL: Healthy, alert and no distress  EYES: Eyes grossly normal to inspection.  No discharge or erythema, or obvious scleral/conjunctival abnormalities.  RESP: No audible wheeze, cough, or visible cyanosis.  No visible retractions or increased work of breathing.    SKIN: Visible skin clear. No significant rash, abnormal pigmentation or lesions.  NEURO: Cranial nerves grossly intact.  Mentation and speech appropriate for age.  PSYCH: Mentation appears normal, affect normal/bright, judgement and insight intact, normal speech and appearance well-groomed.                Video-Visit Details    Video Start Time: 9:35 AM    Type of service:  Video Visit    Video End Time: 9:48     Originating Location (pt. Location): Home    Distant Location (provider location):  [facility]     Platform used for Video Visit: [employer]    .  ..

## 2022-09-28 ASSESSMENT — ANXIETY QUESTIONNAIRES
7. FEELING AFRAID AS IF SOMETHING AWFUL MIGHT HAPPEN: NOT AT ALL
4. TROUBLE RELAXING: NOT AT ALL
3. WORRYING TOO MUCH ABOUT DIFFERENT THINGS: NOT AT ALL
GAD7 TOTAL SCORE: 0
2. NOT BEING ABLE TO STOP OR CONTROL WORRYING: NOT AT ALL
GAD7 TOTAL SCORE: 0
8. IF YOU CHECKED OFF ANY PROBLEMS, HOW DIFFICULT HAVE THESE MADE IT FOR YOU TO DO YOUR WORK, TAKE CARE OF THINGS AT HOME, OR GET ALONG WITH OTHER PEOPLE?: NOT DIFFICULT AT ALL
IF YOU CHECKED OFF ANY PROBLEMS ON THIS QUESTIONNAIRE, HOW DIFFICULT HAVE THESE PROBLEMS MADE IT FOR YOU TO DO YOUR WORK, TAKE CARE OF THINGS AT HOME, OR GET ALONG WITH OTHER PEOPLE: NOT DIFFICULT AT ALL
6. BECOMING EASILY ANNOYED OR IRRITABLE: NOT AT ALL
1. FEELING NERVOUS, ANXIOUS, OR ON EDGE: NOT AT ALL
5. BEING SO RESTLESS THAT IT IS HARD TO SIT STILL: NOT AT ALL
7. FEELING AFRAID AS IF SOMETHING AWFUL MIGHT HAPPEN: NOT AT ALL

## 2022-09-29 ENCOUNTER — VIRTUAL VISIT (OUTPATIENT)
Dept: FAMILY MEDICINE | Facility: CLINIC | Age: 37
End: 2022-09-29
Payer: COMMERCIAL

## 2022-09-29 DIAGNOSIS — F90.0 ATTENTION DEFICIT HYPERACTIVITY DISORDER (ADHD), PREDOMINANTLY INATTENTIVE TYPE: ICD-10-CM

## 2022-09-29 DIAGNOSIS — G47.00 INSOMNIA, UNSPECIFIED TYPE: ICD-10-CM

## 2022-09-29 PROCEDURE — 99213 OFFICE O/P EST LOW 20 MIN: CPT | Mod: 95 | Performed by: NURSE PRACTITIONER

## 2022-09-29 RX ORDER — DEXTROAMPHETAMINE SACCHARATE, AMPHETAMINE ASPARTATE MONOHYDRATE, DEXTROAMPHETAMINE SULFATE AND AMPHETAMINE SULFATE 5; 5; 5; 5 MG/1; MG/1; MG/1; MG/1
20 CAPSULE, EXTENDED RELEASE ORAL DAILY
Qty: 30 CAPSULE | Refills: 0 | Status: SHIPPED | OUTPATIENT
Start: 2022-11-05 | End: 2022-12-05

## 2022-09-29 RX ORDER — TRAZODONE HYDROCHLORIDE 150 MG/1
TABLET ORAL
Qty: 135 TABLET | Refills: 3 | Status: SHIPPED | OUTPATIENT
Start: 2022-09-29 | End: 2023-07-07

## 2022-09-29 RX ORDER — DEXTROAMPHETAMINE SACCHARATE, AMPHETAMINE ASPARTATE, DEXTROAMPHETAMINE SULFATE AND AMPHETAMINE SULFATE 2.5; 2.5; 2.5; 2.5 MG/1; MG/1; MG/1; MG/1
10 TABLET ORAL 2 TIMES DAILY
Qty: 60 TABLET | Refills: 0 | Status: SHIPPED | OUTPATIENT
Start: 2022-12-05 | End: 2023-01-04

## 2022-09-29 RX ORDER — DEXTROAMPHETAMINE SACCHARATE, AMPHETAMINE ASPARTATE, DEXTROAMPHETAMINE SULFATE AND AMPHETAMINE SULFATE 2.5; 2.5; 2.5; 2.5 MG/1; MG/1; MG/1; MG/1
10 TABLET ORAL 2 TIMES DAILY
Qty: 60 TABLET | Refills: 0 | Status: CANCELLED | OUTPATIENT
Start: 2022-09-29

## 2022-09-29 RX ORDER — DEXTROAMPHETAMINE SACCHARATE, AMPHETAMINE ASPARTATE MONOHYDRATE, DEXTROAMPHETAMINE SULFATE AND AMPHETAMINE SULFATE 5; 5; 5; 5 MG/1; MG/1; MG/1; MG/1
20 CAPSULE, EXTENDED RELEASE ORAL DAILY
Qty: 30 CAPSULE | Refills: 0 | Status: SHIPPED | OUTPATIENT
Start: 2022-12-05 | End: 2023-01-04

## 2022-09-29 RX ORDER — DEXTROAMPHETAMINE SACCHARATE, AMPHETAMINE ASPARTATE MONOHYDRATE, DEXTROAMPHETAMINE SULFATE AND AMPHETAMINE SULFATE 5; 5; 5; 5 MG/1; MG/1; MG/1; MG/1
20 CAPSULE, EXTENDED RELEASE ORAL DAILY
Qty: 30 CAPSULE | Refills: 0 | Status: CANCELLED | OUTPATIENT
Start: 2022-09-29

## 2022-09-29 RX ORDER — DEXTROAMPHETAMINE SACCHARATE, AMPHETAMINE ASPARTATE MONOHYDRATE, DEXTROAMPHETAMINE SULFATE AND AMPHETAMINE SULFATE 5; 5; 5; 5 MG/1; MG/1; MG/1; MG/1
20 CAPSULE, EXTENDED RELEASE ORAL DAILY
Qty: 30 CAPSULE | Refills: 0 | Status: SHIPPED | OUTPATIENT
Start: 2022-10-06 | End: 2022-11-05

## 2022-09-29 RX ORDER — DEXTROAMPHETAMINE SACCHARATE, AMPHETAMINE ASPARTATE, DEXTROAMPHETAMINE SULFATE AND AMPHETAMINE SULFATE 2.5; 2.5; 2.5; 2.5 MG/1; MG/1; MG/1; MG/1
10 TABLET ORAL 2 TIMES DAILY
Qty: 60 TABLET | Refills: 0 | Status: SHIPPED | OUTPATIENT
Start: 2022-11-05 | End: 2022-12-05

## 2022-09-29 RX ORDER — DEXTROAMPHETAMINE SACCHARATE, AMPHETAMINE ASPARTATE, DEXTROAMPHETAMINE SULFATE AND AMPHETAMINE SULFATE 2.5; 2.5; 2.5; 2.5 MG/1; MG/1; MG/1; MG/1
10 TABLET ORAL 2 TIMES DAILY
Qty: 60 TABLET | Refills: 0 | Status: SHIPPED | OUTPATIENT
Start: 2022-10-05 | End: 2023-01-15

## 2022-09-29 ASSESSMENT — PATIENT HEALTH QUESTIONNAIRE - PHQ9: SUM OF ALL RESPONSES TO PHQ QUESTIONS 1-9: 1

## 2022-09-29 NOTE — PROGRESS NOTES
Amisha is a 37 year old who is being evaluated via a billable video visit.      How would you like to obtain your AVS? MyChart  If the video visit is dropped, the invitation should be resent by: Text to cell phone: [phone]  Will anyone else be joining your video visit? No          Assessment & Plan     (G47.00) Insomnia, unspecified type  Comment:  Controlled no change in treatment plan  Plan: traZODone (DESYREL) 150 MG tablet      (F90.0) Attention deficit hyperactivity disorder (ADHD), predominantly inattentive type  Comment:  Controlled no change in treatment plan  Plan: amphetamine-dextroamphetamine (ADDERALL XR) 20         MG 24 hr capsule, amphetamine-dextroamphetamine        (ADDERALL XR) 20 MG 24 hr capsule,         amphetamine-dextroamphetamine (ADDERALL XR) 20         MG 24 hr capsule, amphetamine-dextroamphetamine        (ADDERALL) 10 MG tablet,         amphetamine-dextroamphetamine (ADDERALL) 10 MG         tablet, amphetamine-dextroamphetamine         (ADDERALL) 10 MG tablet       ALEENA Bueno [facility]    Subjective   Amisha is a 37 year old, presenting for the following health issues:  No chief complaint on file.      HPI     Medication Followup of Adderall    Taking Medication as prescribed: yes    Side Effects:  None    Medication Helping Symptoms:  yes    Medication Followup of trazodone    Taking Medication as prescribed: yes    Side Effects:  None    Medication Helping Symptoms:  yes        Review of Systems   Constitutional, HEENT, cardiovascular, pulmonary, gi and gu systems are negative, except as otherwise noted.      Objective           Vitals:  No vitals were obtained today due to virtual visit.    Physical Exam   GENERAL: Healthy, alert and no distress  EYES: Eyes grossly normal to inspection.  No discharge or erythema, or obvious scleral/conjunctival abnormalities.  RESP: No audible wheeze, cough, or visible cyanosis.  No visible retractions or  increased work of breathing.    SKIN: Visible skin clear. No significant rash, abnormal pigmentation or lesions.  NEURO: Cranial nerves grossly intact.  Mentation and speech appropriate for age.  PSYCH: Mentation appears normal, affect normal/bright, judgement and insight intact, normal speech and appearance well-groomed.    No results found for any visits on 09/29/22.            Video-Visit Details    Video Start Time: 10:51   Type of service:  Video Visit    Video End Time:11:08 AM    Originating Location (pt. Location): Home    Distant Location (provider location):  [facility]     Platform used for Video Visit: [employer]

## 2022-11-20 ENCOUNTER — HEALTH MAINTENANCE LETTER (OUTPATIENT)
Age: 37
End: 2022-11-20

## 2022-12-22 ENCOUNTER — VIRTUAL VISIT (OUTPATIENT)
Dept: FAMILY MEDICINE | Facility: CLINIC | Age: 37
End: 2022-12-22
Payer: COMMERCIAL

## 2022-12-22 DIAGNOSIS — F90.0 ATTENTION DEFICIT HYPERACTIVITY DISORDER (ADHD), PREDOMINANTLY INATTENTIVE TYPE: Primary | ICD-10-CM

## 2022-12-22 PROCEDURE — 99213 OFFICE O/P EST LOW 20 MIN: CPT | Mod: 95 | Performed by: NURSE PRACTITIONER

## 2022-12-22 RX ORDER — DEXTROAMPHETAMINE SACCHARATE, AMPHETAMINE ASPARTATE, DEXTROAMPHETAMINE SULFATE AND AMPHETAMINE SULFATE 2.5; 2.5; 2.5; 2.5 MG/1; MG/1; MG/1; MG/1
10 TABLET ORAL DAILY
Qty: 30 TABLET | Refills: 0 | Status: SHIPPED | OUTPATIENT
Start: 2023-03-02 | End: 2023-04-01

## 2022-12-22 RX ORDER — DEXTROAMPHETAMINE SACCHARATE, AMPHETAMINE ASPARTATE, DEXTROAMPHETAMINE SULFATE AND AMPHETAMINE SULFATE 2.5; 2.5; 2.5; 2.5 MG/1; MG/1; MG/1; MG/1
10 TABLET ORAL DAILY
Qty: 30 TABLET | Refills: 0 | Status: SHIPPED | OUTPATIENT
Start: 2023-02-02 | End: 2023-01-15 | Stop reason: ALTCHOICE

## 2022-12-22 RX ORDER — DEXTROAMPHETAMINE SACCHARATE, AMPHETAMINE ASPARTATE MONOHYDRATE, DEXTROAMPHETAMINE SULFATE AND AMPHETAMINE SULFATE 5; 5; 5; 5 MG/1; MG/1; MG/1; MG/1
20 CAPSULE, EXTENDED RELEASE ORAL DAILY
Qty: 30 CAPSULE | Refills: 0 | Status: SHIPPED | OUTPATIENT
Start: 2023-02-02 | End: 2023-03-04

## 2022-12-22 RX ORDER — DEXTROAMPHETAMINE SACCHARATE, AMPHETAMINE ASPARTATE MONOHYDRATE, DEXTROAMPHETAMINE SULFATE AND AMPHETAMINE SULFATE 5; 5; 5; 5 MG/1; MG/1; MG/1; MG/1
20 CAPSULE, EXTENDED RELEASE ORAL DAILY
Qty: 30 CAPSULE | Refills: 0 | Status: SHIPPED | OUTPATIENT
Start: 2023-03-02 | End: 2023-04-01

## 2022-12-22 RX ORDER — DEXTROAMPHETAMINE SACCHARATE, AMPHETAMINE ASPARTATE MONOHYDRATE, DEXTROAMPHETAMINE SULFATE AND AMPHETAMINE SULFATE 5; 5; 5; 5 MG/1; MG/1; MG/1; MG/1
20 CAPSULE, EXTENDED RELEASE ORAL DAILY
Qty: 30 CAPSULE | Refills: 0 | Status: SHIPPED | OUTPATIENT
Start: 2023-01-02 | End: 2023-02-01

## 2022-12-22 RX ORDER — DEXTROAMPHETAMINE SACCHARATE, AMPHETAMINE ASPARTATE, DEXTROAMPHETAMINE SULFATE AND AMPHETAMINE SULFATE 2.5; 2.5; 2.5; 2.5 MG/1; MG/1; MG/1; MG/1
10 TABLET ORAL DAILY
Qty: 30 TABLET | Refills: 0 | Status: SHIPPED | OUTPATIENT
Start: 2023-01-02 | End: 2023-02-01

## 2022-12-22 NOTE — PROGRESS NOTES
"Amisha is a 37 year old who is being evaluated via a billable video visit.      How would you like to obtain your AVS? MyChart  If the video visit is dropped, the invitation should be resent by: Text to cell phone: [phone]  Will anyone else be joining your video visit? No          Assessment & Plan     (F90.0) Attention deficit hyperactivity disorder (ADHD), predominantly inattentive type  (primary encounter diagnosis)  Comment:  Controlled no change in treatment plan  Plan: amphetamine-dextroamphetamine (ADDERALL XR) 20         MG 24 hr capsule, amphetamine-dextroamphetamine        (ADDERALL XR) 20 MG 24 hr capsule,         amphetamine-dextroamphetamine (ADDERALL XR) 20         MG 24 hr capsule, amphetamine-dextroamphetamine        (ADDERALL) 10 MG tablet,         amphetamine-dextroamphetamine (ADDERALL) 10 MG         tablet, amphetamine-dextroamphetamine         (ADDERALL) 10 MG tablet                 BMI:   Estimated body mass index is 33.81 kg/m  as calculated from the following:    Height as of 1/11/22: 1.626 m (5' 4\").    Weight as of 4/29/22: 89.4 kg (197 lb).   Weight management plan: Discussed healthy diet and exercise guidelines    See Patient Instructions    No follow-ups on file.    ALEENA Bueno CNP  [facility]    Subjective   Amisha is a 37 year old, presenting for the following health issues:  No chief complaint on file.      History of Present Illness       Reason for visit:  Medication refill    She eats 2-3 servings of fruits and vegetables daily.She consumes 0 sweetened beverage(s) daily.She exercises with enough effort to increase her heart rate 30 to 60 minutes per day.  She exercises with enough effort to increase her heart rate 3 or less days per week.   She is taking medications regularly.       Medication Followup of Adderall    Taking Medication as prescribed: yes    Side Effects:  None    Medication Helping Symptoms:  yes          Review of Systems "         Objective           Vitals:  No vitals were obtained today due to virtual visit.    Physical Exam   GENERAL: Healthy, alert and no distress  EYES: Eyes grossly normal to inspection.  No discharge or erythema, or obvious scleral/conjunctival abnormalities.  RESP: No audible wheeze, cough, or visible cyanosis.  No visible retractions or increased work of breathing.    SKIN: Visible skin clear. No significant rash, abnormal pigmentation or lesions.  NEURO: Cranial nerves grossly intact.  Mentation and speech appropriate for age.  PSYCH: Mentation appears normal, affect normal/bright, judgement and insight intact, normal speech and appearance well-groomed.                Video-Visit Details    Type of service:  Video Visit   Video Start Time: 4:01 PM  Video End Time:4:10    Originating Location (pt. Location): Home    Distant Location (provider location):  On-site  Platform used for Video Visit: Judy

## 2023-01-03 DIAGNOSIS — S13.9XXA SPRAIN OF NECK, INITIAL ENCOUNTER: ICD-10-CM

## 2023-01-03 RX ORDER — CYCLOBENZAPRINE HCL 10 MG
10 TABLET ORAL 3 TIMES DAILY PRN
Qty: 40 TABLET | Refills: 3 | Status: SHIPPED | OUTPATIENT
Start: 2023-01-03 | End: 2023-07-07

## 2023-01-03 NOTE — TELEPHONE ENCOUNTER
Routing to ordering provider for consideration, not on refill protocol.           Latasha Burrows     RN MSN

## 2023-01-13 ENCOUNTER — MYC MEDICAL ADVICE (OUTPATIENT)
Dept: FAMILY MEDICINE | Facility: CLINIC | Age: 38
End: 2023-01-13

## 2023-01-13 DIAGNOSIS — F90.0 ATTENTION DEFICIT HYPERACTIVITY DISORDER (ADHD), PREDOMINANTLY INATTENTIVE TYPE: ICD-10-CM

## 2023-01-15 RX ORDER — DEXTROAMPHETAMINE SACCHARATE, AMPHETAMINE ASPARTATE, DEXTROAMPHETAMINE SULFATE AND AMPHETAMINE SULFATE 2.5; 2.5; 2.5; 2.5 MG/1; MG/1; MG/1; MG/1
10 TABLET ORAL 2 TIMES DAILY
Qty: 30 TABLET | Refills: 0 | Status: SHIPPED | OUTPATIENT
Start: 2023-01-15 | End: 2023-07-05

## 2023-01-15 RX ORDER — DEXTROAMPHETAMINE SACCHARATE, AMPHETAMINE ASPARTATE, DEXTROAMPHETAMINE SULFATE AND AMPHETAMINE SULFATE 2.5; 2.5; 2.5; 2.5 MG/1; MG/1; MG/1; MG/1
10 TABLET ORAL 2 TIMES DAILY
Qty: 60 TABLET | Refills: 0 | Status: SHIPPED | OUTPATIENT
Start: 2023-02-02 | End: 2023-03-04

## 2023-01-15 RX ORDER — DEXTROAMPHETAMINE SACCHARATE, AMPHETAMINE ASPARTATE, DEXTROAMPHETAMINE SULFATE AND AMPHETAMINE SULFATE 2.5; 2.5; 2.5; 2.5 MG/1; MG/1; MG/1; MG/1
10 TABLET ORAL 2 TIMES DAILY
Qty: 60 TABLET | Refills: 0 | Status: SHIPPED | OUTPATIENT
Start: 2023-03-02 | End: 2023-04-01

## 2023-04-03 ASSESSMENT — ANXIETY QUESTIONNAIRES
GAD7 TOTAL SCORE: 0
GAD7 TOTAL SCORE: 0
1. FEELING NERVOUS, ANXIOUS, OR ON EDGE: NOT AT ALL
3. WORRYING TOO MUCH ABOUT DIFFERENT THINGS: NOT AT ALL
6. BECOMING EASILY ANNOYED OR IRRITABLE: NOT AT ALL
4. TROUBLE RELAXING: NOT AT ALL
7. FEELING AFRAID AS IF SOMETHING AWFUL MIGHT HAPPEN: NOT AT ALL
7. FEELING AFRAID AS IF SOMETHING AWFUL MIGHT HAPPEN: NOT AT ALL
GAD7 TOTAL SCORE: 0
5. BEING SO RESTLESS THAT IT IS HARD TO SIT STILL: NOT AT ALL
2. NOT BEING ABLE TO STOP OR CONTROL WORRYING: NOT AT ALL

## 2023-04-04 ENCOUNTER — VIRTUAL VISIT (OUTPATIENT)
Dept: FAMILY MEDICINE | Facility: CLINIC | Age: 38
End: 2023-04-04
Payer: COMMERCIAL

## 2023-04-04 DIAGNOSIS — F33.1 MAJOR DEPRESSIVE DISORDER, RECURRENT EPISODE, MODERATE (H): ICD-10-CM

## 2023-04-04 DIAGNOSIS — F90.0 ATTENTION DEFICIT HYPERACTIVITY DISORDER (ADHD), PREDOMINANTLY INATTENTIVE TYPE: Primary | ICD-10-CM

## 2023-04-04 PROCEDURE — 99213 OFFICE O/P EST LOW 20 MIN: CPT | Mod: VID | Performed by: NURSE PRACTITIONER

## 2023-04-04 RX ORDER — DEXTROAMPHETAMINE SACCHARATE, AMPHETAMINE ASPARTATE, DEXTROAMPHETAMINE SULFATE AND AMPHETAMINE SULFATE 2.5; 2.5; 2.5; 2.5 MG/1; MG/1; MG/1; MG/1
10 TABLET ORAL 2 TIMES DAILY
Qty: 60 TABLET | Refills: 0 | Status: SHIPPED | OUTPATIENT
Start: 2023-06-05 | End: 2023-07-05

## 2023-04-04 RX ORDER — DEXTROAMPHETAMINE SACCHARATE, AMPHETAMINE ASPARTATE MONOHYDRATE, DEXTROAMPHETAMINE SULFATE AND AMPHETAMINE SULFATE 5; 5; 5; 5 MG/1; MG/1; MG/1; MG/1
20 CAPSULE, EXTENDED RELEASE ORAL DAILY
Qty: 30 CAPSULE | Refills: 0 | Status: SHIPPED | OUTPATIENT
Start: 2023-05-05 | End: 2023-06-04

## 2023-04-04 RX ORDER — DEXTROAMPHETAMINE SACCHARATE, AMPHETAMINE ASPARTATE, DEXTROAMPHETAMINE SULFATE AND AMPHETAMINE SULFATE 2.5; 2.5; 2.5; 2.5 MG/1; MG/1; MG/1; MG/1
10 TABLET ORAL 2 TIMES DAILY
Qty: 60 TABLET | Refills: 0 | Status: SHIPPED | OUTPATIENT
Start: 2023-05-05 | End: 2023-06-04

## 2023-04-04 RX ORDER — DEXTROAMPHETAMINE SACCHARATE, AMPHETAMINE ASPARTATE, DEXTROAMPHETAMINE SULFATE AND AMPHETAMINE SULFATE 2.5; 2.5; 2.5; 2.5 MG/1; MG/1; MG/1; MG/1
10 TABLET ORAL 2 TIMES DAILY
Qty: 60 TABLET | Refills: 0 | Status: SHIPPED | OUTPATIENT
Start: 2023-04-04 | End: 2023-05-04

## 2023-04-04 RX ORDER — DEXTROAMPHETAMINE SACCHARATE, AMPHETAMINE ASPARTATE MONOHYDRATE, DEXTROAMPHETAMINE SULFATE AND AMPHETAMINE SULFATE 5; 5; 5; 5 MG/1; MG/1; MG/1; MG/1
20 CAPSULE, EXTENDED RELEASE ORAL DAILY
Qty: 30 CAPSULE | Refills: 0 | Status: SHIPPED | OUTPATIENT
Start: 2023-04-04 | End: 2023-05-04

## 2023-04-04 RX ORDER — DEXTROAMPHETAMINE SACCHARATE, AMPHETAMINE ASPARTATE MONOHYDRATE, DEXTROAMPHETAMINE SULFATE AND AMPHETAMINE SULFATE 5; 5; 5; 5 MG/1; MG/1; MG/1; MG/1
20 CAPSULE, EXTENDED RELEASE ORAL DAILY
Qty: 30 CAPSULE | Refills: 0 | Status: SHIPPED | OUTPATIENT
Start: 2023-06-05 | End: 2023-07-05

## 2023-04-04 ASSESSMENT — PATIENT HEALTH QUESTIONNAIRE - PHQ9: SUM OF ALL RESPONSES TO PHQ QUESTIONS 1-9: 0

## 2023-04-04 ASSESSMENT — ANXIETY QUESTIONNAIRES: GAD7 TOTAL SCORE: 0

## 2023-04-04 NOTE — PROGRESS NOTES
Amisha is a 38 year old who is being evaluated via a billable video visit.      How would you like to obtain your AVS? MyChart  If the video visit is dropped, the invitation should be resent by: Text to cell phone: [phone]  Will anyone else be joining your video visit? No        Assessment & Plan     (F90.0) Attention deficit hyperactivity disorder (ADHD), predominantly inattentive type  (primary encounter diagnosis)  Comment:  Controlled no change in treatment plan  Plan: REVIEW OF HEALTH MAINTENANCE PROTOCOL ORDERS,         amphetamine-dextroamphetamine (ADDERALL) 10 MG         tablet, amphetamine-dextroamphetamine         (ADDERALL) 10 MG tablet,         amphetamine-dextroamphetamine (ADDERALL) 10 MG         tablet, amphetamine-dextroamphetamine (ADDERALL        XR) 20 MG 24 hr capsule,         amphetamine-dextroamphetamine (ADDERALL XR) 20         MG 24 hr capsule, amphetamine-dextroamphetamine        (ADDERALL XR) 20 MG 24 hr capsule      See Patient Instructions    ALEENA Bueno CNP  [facility]    Subjective   Amisha is a 38 year old, presenting for the following health issues:  No chief complaint on file.        4/4/2023    11:05 AM   Additional Questions   Roomed by [facility]     History of Present Illness       Reason for visit:  Medication refill    She eats 2-3 servings of fruits and vegetables daily.She consumes 1 sweetened beverage(s) daily.She exercises with enough effort to increase her heart rate 30 to 60 minutes per day.  She exercises with enough effort to increase her heart rate 3 or less days per week.   She is taking medications regularly.  Today's LUIS-7 Score: 0       Medication Followup of adderall    Taking Medication as prescribed: yes    Side Effects:  None    Medication Helping Symptoms:  yes            Review of Systems   Constitutional, HEENT, cardiovascular, pulmonary, gi and gu systems are negative, except as otherwise noted.      Objective            Vitals:  No vitals were obtained today due to virtual visit.    Physical Exam   GENERAL: Healthy, alert and no distress  EYES: Eyes grossly normal to inspection.  No discharge or erythema, or obvious scleral/conjunctival abnormalities.  RESP: No audible wheeze, cough, or visible cyanosis.  No visible retractions or increased work of breathing.    SKIN: Visible skin clear. No significant rash, abnormal pigmentation or lesions.  NEURO: Cranial nerves grossly intact.  Mentation and speech appropriate for age.  PSYCH: Mentation appears normal, affect normal/bright, judgement and insight intact, normal speech and appearance well-groomed.    No results found for any visits on 04/04/23.            Video-Visit Details    Type of service:  Video Visit     Originating Location (pt. Location): Home    Distant Location (provider location):  On-site  Platform used for Video Visit: Judy

## 2023-04-15 ENCOUNTER — HEALTH MAINTENANCE LETTER (OUTPATIENT)
Age: 38
End: 2023-04-15

## 2023-07-04 ASSESSMENT — ENCOUNTER SYMPTOMS
COUGH: 0
CHILLS: 0
NAUSEA: 0
BREAST MASS: 0
MYALGIAS: 0
HEARTBURN: 0
SHORTNESS OF BREATH: 0
PALPITATIONS: 0
DIARRHEA: 0
NERVOUS/ANXIOUS: 0
HEMATURIA: 0
WEAKNESS: 0
JOINT SWELLING: 0
FEVER: 0
ARTHRALGIAS: 0
PARESTHESIAS: 0
EYE PAIN: 0
HEADACHES: 0
ABDOMINAL PAIN: 0
SORE THROAT: 0
FREQUENCY: 0
CONSTIPATION: 0
DYSURIA: 0
DIZZINESS: 0
HEMATOCHEZIA: 0

## 2023-07-07 ENCOUNTER — OFFICE VISIT (OUTPATIENT)
Dept: FAMILY MEDICINE | Facility: CLINIC | Age: 38
End: 2023-07-07
Payer: COMMERCIAL

## 2023-07-07 VITALS
BODY MASS INDEX: 32.61 KG/M2 | HEIGHT: 64 IN | RESPIRATION RATE: 16 BRPM | TEMPERATURE: 98.1 F | SYSTOLIC BLOOD PRESSURE: 108 MMHG | WEIGHT: 191 LBS | HEART RATE: 81 BPM | DIASTOLIC BLOOD PRESSURE: 64 MMHG | OXYGEN SATURATION: 100 %

## 2023-07-07 DIAGNOSIS — Z11.3 SCREEN FOR STD (SEXUALLY TRANSMITTED DISEASE): ICD-10-CM

## 2023-07-07 DIAGNOSIS — L30.9 ECZEMA, UNSPECIFIED TYPE: ICD-10-CM

## 2023-07-07 DIAGNOSIS — G47.00 INSOMNIA, UNSPECIFIED TYPE: ICD-10-CM

## 2023-07-07 DIAGNOSIS — F90.0 ATTENTION DEFICIT HYPERACTIVITY DISORDER (ADHD), PREDOMINANTLY INATTENTIVE TYPE: ICD-10-CM

## 2023-07-07 DIAGNOSIS — S13.9XXA SPRAIN OF NECK, INITIAL ENCOUNTER: ICD-10-CM

## 2023-07-07 DIAGNOSIS — L98.9 SKIN LESION: ICD-10-CM

## 2023-07-07 DIAGNOSIS — Z00.00 ROUTINE GENERAL MEDICAL EXAMINATION AT A HEALTH CARE FACILITY: Primary | ICD-10-CM

## 2023-07-07 DIAGNOSIS — B96.89 BV (BACTERIAL VAGINOSIS): ICD-10-CM

## 2023-07-07 DIAGNOSIS — N76.0 BV (BACTERIAL VAGINOSIS): ICD-10-CM

## 2023-07-07 LAB
ALBUMIN SERPL BCG-MCNC: 4.7 G/DL (ref 3.5–5.2)
ALP SERPL-CCNC: 83 U/L (ref 35–104)
ALT SERPL W P-5'-P-CCNC: 18 U/L (ref 0–50)
ANION GAP SERPL CALCULATED.3IONS-SCNC: 9 MMOL/L (ref 7–15)
AST SERPL W P-5'-P-CCNC: 17 U/L (ref 0–45)
BILIRUB SERPL-MCNC: 0.7 MG/DL
BUN SERPL-MCNC: 9.9 MG/DL (ref 6–20)
CALCIUM SERPL-MCNC: 9.5 MG/DL (ref 8.6–10)
CHLORIDE SERPL-SCNC: 104 MMOL/L (ref 98–107)
CHOLEST SERPL-MCNC: 214 MG/DL
CLUE CELLS: PRESENT
CREAT SERPL-MCNC: 0.71 MG/DL (ref 0.51–0.95)
DEPRECATED HCO3 PLAS-SCNC: 25 MMOL/L (ref 22–29)
GFR SERPL CREATININE-BSD FRML MDRD: >90 ML/MIN/1.73M2
GLUCOSE SERPL-MCNC: 96 MG/DL (ref 70–99)
HDLC SERPL-MCNC: 54 MG/DL
LDLC SERPL CALC-MCNC: 142 MG/DL
NONHDLC SERPL-MCNC: 160 MG/DL
POTASSIUM SERPL-SCNC: 4.6 MMOL/L (ref 3.4–5.3)
PROT SERPL-MCNC: 7.4 G/DL (ref 6.4–8.3)
SODIUM SERPL-SCNC: 138 MMOL/L (ref 136–145)
TRICHOMONAS, WET PREP: ABNORMAL
TRIGL SERPL-MCNC: 89 MG/DL
TSH SERPL DL<=0.005 MIU/L-ACNC: 1.25 UIU/ML (ref 0.3–4.2)
WBC'S/HIGH POWER FIELD, WET PREP: ABNORMAL
YEAST, WET PREP: ABNORMAL

## 2023-07-07 PROCEDURE — 87210 SMEAR WET MOUNT SALINE/INK: CPT | Performed by: NURSE PRACTITIONER

## 2023-07-07 PROCEDURE — 80053 COMPREHEN METABOLIC PANEL: CPT | Performed by: NURSE PRACTITIONER

## 2023-07-07 PROCEDURE — 99395 PREV VISIT EST AGE 18-39: CPT | Performed by: NURSE PRACTITIONER

## 2023-07-07 PROCEDURE — 99214 OFFICE O/P EST MOD 30 MIN: CPT | Mod: 25 | Performed by: NURSE PRACTITIONER

## 2023-07-07 PROCEDURE — 87491 CHLMYD TRACH DNA AMP PROBE: CPT | Performed by: NURSE PRACTITIONER

## 2023-07-07 PROCEDURE — 84443 ASSAY THYROID STIM HORMONE: CPT | Performed by: NURSE PRACTITIONER

## 2023-07-07 PROCEDURE — 36415 COLL VENOUS BLD VENIPUNCTURE: CPT | Performed by: NURSE PRACTITIONER

## 2023-07-07 PROCEDURE — 87591 N.GONORRHOEAE DNA AMP PROB: CPT | Performed by: NURSE PRACTITIONER

## 2023-07-07 PROCEDURE — 80061 LIPID PANEL: CPT | Performed by: NURSE PRACTITIONER

## 2023-07-07 RX ORDER — CYCLOBENZAPRINE HCL 10 MG
10 TABLET ORAL 3 TIMES DAILY PRN
Qty: 40 TABLET | Refills: 3 | Status: SHIPPED | OUTPATIENT
Start: 2023-07-07 | End: 2024-03-03

## 2023-07-07 RX ORDER — DEXTROAMPHETAMINE SACCHARATE, AMPHETAMINE ASPARTATE, DEXTROAMPHETAMINE SULFATE AND AMPHETAMINE SULFATE 2.5; 2.5; 2.5; 2.5 MG/1; MG/1; MG/1; MG/1
10 TABLET ORAL 2 TIMES DAILY
Qty: 60 TABLET | Refills: 0 | Status: SHIPPED | OUTPATIENT
Start: 2023-08-06 | End: 2023-09-05

## 2023-07-07 RX ORDER — DEXTROAMPHETAMINE SACCHARATE, AMPHETAMINE ASPARTATE, DEXTROAMPHETAMINE SULFATE AND AMPHETAMINE SULFATE 2.5; 2.5; 2.5; 2.5 MG/1; MG/1; MG/1; MG/1
10 TABLET ORAL 2 TIMES DAILY
Qty: 60 TABLET | Refills: 0 | Status: SHIPPED | OUTPATIENT
Start: 2023-07-07 | End: 2023-08-06

## 2023-07-07 RX ORDER — CLINDAMYCIN HCL 300 MG
300 CAPSULE ORAL 2 TIMES DAILY
Qty: 14 CAPSULE | Refills: 0 | Status: SHIPPED | OUTPATIENT
Start: 2023-07-07 | End: 2023-07-14

## 2023-07-07 RX ORDER — DEXTROAMPHETAMINE SACCHARATE, AMPHETAMINE ASPARTATE MONOHYDRATE, DEXTROAMPHETAMINE SULFATE AND AMPHETAMINE SULFATE 5; 5; 5; 5 MG/1; MG/1; MG/1; MG/1
20 CAPSULE, EXTENDED RELEASE ORAL DAILY
Qty: 30 CAPSULE | Refills: 0 | Status: SHIPPED | OUTPATIENT
Start: 2023-08-06 | End: 2023-09-05

## 2023-07-07 RX ORDER — DEXTROAMPHETAMINE SACCHARATE, AMPHETAMINE ASPARTATE MONOHYDRATE, DEXTROAMPHETAMINE SULFATE AND AMPHETAMINE SULFATE 5; 5; 5; 5 MG/1; MG/1; MG/1; MG/1
20 CAPSULE, EXTENDED RELEASE ORAL DAILY
Qty: 30 CAPSULE | Refills: 0 | Status: SHIPPED | OUTPATIENT
Start: 2023-07-07 | End: 2023-08-06

## 2023-07-07 RX ORDER — DEXTROAMPHETAMINE SACCHARATE, AMPHETAMINE ASPARTATE, DEXTROAMPHETAMINE SULFATE AND AMPHETAMINE SULFATE 2.5; 2.5; 2.5; 2.5 MG/1; MG/1; MG/1; MG/1
10 TABLET ORAL 2 TIMES DAILY
Qty: 60 TABLET | Refills: 0 | Status: SHIPPED | OUTPATIENT
Start: 2023-09-05 | End: 2023-10-05

## 2023-07-07 RX ORDER — DEXTROAMPHETAMINE SACCHARATE, AMPHETAMINE ASPARTATE MONOHYDRATE, DEXTROAMPHETAMINE SULFATE AND AMPHETAMINE SULFATE 5; 5; 5; 5 MG/1; MG/1; MG/1; MG/1
20 CAPSULE, EXTENDED RELEASE ORAL DAILY
Qty: 30 CAPSULE | Refills: 0 | Status: SHIPPED | OUTPATIENT
Start: 2023-09-05 | End: 2023-10-05

## 2023-07-07 RX ORDER — TRIAMCINOLONE ACETONIDE 1 MG/G
CREAM TOPICAL 2 TIMES DAILY
Qty: 30 G | Refills: 0 | Status: SHIPPED | OUTPATIENT
Start: 2023-07-07

## 2023-07-07 RX ORDER — TRAZODONE HYDROCHLORIDE 150 MG/1
TABLET ORAL
Qty: 135 TABLET | Refills: 3 | Status: SHIPPED | OUTPATIENT
Start: 2023-07-07 | End: 2024-07-09

## 2023-07-07 ASSESSMENT — ENCOUNTER SYMPTOMS
FEVER: 0
NERVOUS/ANXIOUS: 0
CHILLS: 0
COUGH: 0
BREAST MASS: 0
ABDOMINAL PAIN: 0
HEMATURIA: 0
SHORTNESS OF BREATH: 0
DIZZINESS: 0
HEMATOCHEZIA: 0
ARTHRALGIAS: 0
HEARTBURN: 0
PARESTHESIAS: 0
FREQUENCY: 0
HEADACHES: 0
NAUSEA: 0
DIARRHEA: 0
JOINT SWELLING: 0
SORE THROAT: 0
EYE PAIN: 0
CONSTIPATION: 0
MYALGIAS: 0
PALPITATIONS: 0
DYSURIA: 0
WEAKNESS: 0

## 2023-07-07 ASSESSMENT — PAIN SCALES - GENERAL: PAINLEVEL: NO PAIN (0)

## 2023-07-07 NOTE — PROGRESS NOTES
SUBJECTIVE:   CC: Amisha is an 38 year old who presents for preventive health visit.       7/7/2023     6:47 AM   Additional Questions   Roomed by Carlee     Healthy Habits:     Getting at least 3 servings of Calcium per day:  NO    Bi-annual eye exam:  Yes    Dental care twice a year:  NO    Sleep apnea or symptoms of sleep apnea:  None    Diet:  Regular (no restrictions)    Frequency of exercise:  2-3 days/week    Duration of exercise:  15-30 minutes    Taking medications regularly:  Yes    Medication side effects:  Not applicable    Additional concerns today:  No  A.D.H.D  Pertinent negatives include no abdominal pain, arthralgias, chest pain, chills, congestion, coughing, fever, headaches, joint swelling, myalgias, nausea, rash, sore throat or weakness.     Vaginal Problem - states sat in wet swim suit x 1 week while in [facility]. Would like   STD testing and Wt prep.    Ears - Would like checked for wax    Skin Check - would like moles looked at and possible referral to Dermatology     ADHD Follow-Up    Date of last ADHD office visit: 4/4/23  Status since last visit: Stable  Taking controlled (daily) medications as prescribed: Yes                       Patient Concerns with Medications: None    Medication Benefits:   Controlled symptoms: Hyperactivity - motor restlessness, Attention span, Distractability, Finishing tasks, Impulse control, Frustration tolerance, Accepting limits and Peer relations  Uncontrolled Symptoms: None    Medication side effects:  Side effects noted: none  Denies: none            Social History     Tobacco Use     Smoking status: Former     Packs/day: 0.00     Types: Cigarettes     Smokeless tobacco: Never   Substance Use Topics     Alcohol use: Yes     Comment: rare             7/4/2023     9:37 PM   Alcohol Use   Prescreen: >3 drinks/day or >7 drinks/week? Not Applicable     Reviewed orders with patient.  Reviewed health maintenance and updated orders accordingly - No  Lab work is in  process  Labs reviewed in EPIC  BP Readings from Last 3 Encounters:   23 108/64   22 115/78   22 124/70    Wt Readings from Last 3 Encounters:   23 89.4 kg (197 lb)   22 89.4 kg (197 lb)   22 88 kg (194 lb)                  Patient Active Problem List   Diagnosis     Mirena IUD- Placed 18     ADHD (attention deficit hyperactivity disorder), combined type     Attention deficit hyperactivity disorder (ADHD), predominantly inattentive type     Major depressive disorder, recurrent episode, moderate (H)     ASCUS of cervix with negative high risk HPV     No past surgical history on file.    Social History     Tobacco Use     Smoking status: Former     Packs/day: 0.00     Types: Cigarettes     Smokeless tobacco: Never   Substance Use Topics     Alcohol use: Yes     Comment: rare     Family History   Problem Relation Age of Onset     Eye Disorder Maternal Grandfather      Cancer Maternal Grandfather          Current Outpatient Medications   Medication Sig Dispense Refill     cyclobenzaprine (FLEXERIL) 10 MG tablet Take 1 tablet (10 mg) by mouth 3 times daily as needed for muscle spasms 40 tablet 3     levonorgestrel (MIRENA) 20 MCG/24HR IUD 1 each (20 mcg) by Intrauterine route continuous       traZODone (DESYREL) 150 MG tablet Take 1 and 1/2 tablets (total 225 mg) at bedtime  tablet 3     triamcinolone (KENALOG) 0.1 % external cream Apply topically 2 times daily 30 g 0     No Known Allergies  Recent Labs   Lab Test 10/18/19  0909   TSH 0.60        Breast Cancer Screenin/11/2022     1:32 PM   Breast CA Risk Assessment (FHS-7)   Do you have a family history of breast, colon, or ovarian cancer? No / Unknown       click delete button to remove this line now  Mammogram Screening - Offered annual screening and updated Health Maintenance for mutual plan based on risk factor consideration    Pertinent mammograms are reviewed under the imaging tab.    History of abnormal  "Pap smear: NO - age 30- 65 PAP every 3 years recommended      Latest Ref Rng & Units 2022     1:34 PM   PAP / HPV   PAP  Atypical squamous cells of undetermined significance (ASC-US)    HPV 16 DNA Negative Negative    HPV 18 DNA Negative Negative    Other HR HPV Negative Negative      Reviewed and updated as needed this visit by clinical staff                  Reviewed and updated as needed this visit by Provider                 Past Medical History:   Diagnosis Date     Abnormal Pap smear of cervix 2022      No past surgical history on file.  OB History    Para Term  AB Living   2 0 0 0 0 2   SAB IAB Ectopic Multiple Live Births   0 0 0 0 2      # Outcome Date GA Lbr Charbel/2nd Weight Sex Delivery Anes PTL Lv   2             1                 Review of Systems   Constitutional: Negative for chills and fever.   HENT: Negative for congestion, ear pain, hearing loss and sore throat.    Eyes: Negative for pain and visual disturbance.   Respiratory: Negative for cough and shortness of breath.    Cardiovascular: Negative for chest pain, palpitations and peripheral edema.   Gastrointestinal: Negative for abdominal pain, constipation, diarrhea, heartburn, hematochezia and nausea.   Breasts:  Negative for tenderness, breast mass and discharge.   Genitourinary: Negative for dysuria, frequency, genital sores, hematuria, pelvic pain, urgency, vaginal bleeding and vaginal discharge.   Musculoskeletal: Negative for arthralgias, joint swelling and myalgias.   Skin: Negative for rash.   Neurological: Negative for dizziness, weakness, headaches and paresthesias.   Psychiatric/Behavioral: Negative for mood changes. The patient is not nervous/anxious.           OBJECTIVE:   /64 (Cuff Size: Adult Large)   Pulse 81   Temp 98.1  F (36.7  C) (Tympanic)   Resp 16   Ht 1.626 m (5' 4\")   Wt 89.4 kg (197 lb)   SpO2 100%   BMI 33.81 kg/m    Physical Exam  GENERAL: healthy, alert and no " distress  EYES: Eyes grossly normal to inspection, PERRL and conjunctivae and sclerae normal  HENT: ear canals and TM's normal, nose and mouth without ulcers or lesions  NECK: no adenopathy, no asymmetry, masses, or scars and thyroid normal to palpation  RESP: lungs clear to auscultation - no rales, rhonchi or wheezes  BREAST: normal without masses, tenderness or nipple discharge and no palpable axillary masses or adenopathy  CV: regular rate and rhythm, normal S1 S2, no S3 or S4, no murmur, click or rub, no peripheral edema and peripheral pulses strong  ABDOMEN: soft, nontender, no hepatosplenomegaly, no masses and bowel sounds normal  MS: no gross musculoskeletal defects noted, no edema  SKIN: no suspicious lesions or rashes  NEURO: Normal strength and tone, mentation intact and speech normal  PSYCH: mentation appears normal, affect normal/bright    Diagnostic Test Results:  Labs reviewed in Epic  No results found for any visits on 07/07/23.   Results for orders placed or performed in visit on 07/07/23   Wet prep - Clinic Collect     Status: Abnormal    Specimen: Vagina; Swab   Result Value Ref Range    Trichomonas Absent Absent    Yeast Absent Absent    Clue Cells Present (A) Absent    WBCs/high power field None None       ASSESSMENT/PLAN:   Amisha was seen today for physical and a.d.h.d.    Diagnoses and all orders for this visit:    Routine general medical examination at a health care facility  -     Lipid panel reflex to direct LDL Fasting; Future  -     TSH with free T4 reflex; Future  -     Comprehensive metabolic panel (BMP + Alb, Alk Phos, ALT, AST, Total. Bili, TP); Future  -     Comprehensive metabolic panel (BMP + Alb, Alk Phos, ALT, AST, Total. Bili, TP)  -     TSH with free T4 reflex  -     Lipid panel reflex to direct LDL Fasting    Sprain of neck, initial encounter  -     cyclobenzaprine (FLEXERIL) 10 MG tablet; Take 1 tablet (10 mg) by mouth 3 times daily as needed for muscle spasms  -      OFFICE/OUTPT VISIT,EST,LEVL IV    Eczema, unspecified type  -     triamcinolone (KENALOG) 0.1 % external cream; Apply topically 2 times daily  -     OFFICE/OUTPT VISIT,EST,LEVL IV    Insomnia, unspecified type  -     traZODone (DESYREL) 150 MG tablet; Take 1 and 1/2 tablets (total 225 mg) at bedtime PRN  -     OFFICE/OUTPT VISIT,EST,LEVL IV    Skin lesion  -     Adult Dermatology Referral; Future  -     OFFICE/OUTPT VISIT,EST,LEVL IV    Screen for STD (sexually transmitted disease)  -     Wet prep - Clinic Collect  -     Chlamydia & Gonorrhea by PCR, GICH/Range - Clinic Collect  -     OFFICE/OUTPT VISIT,EST,LEVL IV    Attention deficit hyperactivity disorder (ADHD), predominantly inattentive type  -     amphetamine-dextroamphetamine (ADDERALL XR) 20 MG 24 hr capsule; Take 1 capsule (20 mg) by mouth daily for 30 days  -     amphetamine-dextroamphetamine (ADDERALL XR) 20 MG 24 hr capsule; Take 1 capsule (20 mg) by mouth daily for 30 days  -     amphetamine-dextroamphetamine (ADDERALL XR) 20 MG 24 hr capsule; Take 1 capsule (20 mg) by mouth daily for 30 days  -     amphetamine-dextroamphetamine (ADDERALL) 10 MG tablet; Take 1 tablet (10 mg) by mouth 2 times daily for 30 days  -     amphetamine-dextroamphetamine (ADDERALL) 10 MG tablet; Take 1 tablet (10 mg) by mouth 2 times daily for 30 days  -     amphetamine-dextroamphetamine (ADDERALL) 10 MG tablet; Take 1 tablet (10 mg) by mouth 2 times daily for 30 days  -     OFFICE/OUTPT VISIT,EST,LEVL IV    BV (bacterial vaginosis)  -     clindamycin (CLEOCIN) 300 MG capsule; Take 1 capsule (300 mg) by mouth 2 times daily for 7 days        Patient has been advised of split billing requirements and indicates understanding: Yes      COUNSELING:  Reviewed preventive health counseling, as reflected in patient instructions       Regular exercise       Healthy diet/nutrition       Vision screening       Hearing screening       Alcohol Use       Contraception       Family planning        Folic Acid       Osteoporosis prevention/bone health       Safe sex practices/STD prevention       Colorectal Cancer Screening       Consider Hep C screening for all patients one time for ages 18-79 years       (Jacklyn)menopause management        She reports that she has quit smoking. Her smoking use included cigarettes. She has never used smokeless tobacco.          ALEENA Bueno CNP  [facility]

## 2023-07-08 LAB
C TRACH DNA SPEC QL PROBE+SIG AMP: NEGATIVE
N GONORRHOEA DNA SPEC QL NAA+PROBE: NEGATIVE

## 2023-10-09 ASSESSMENT — PATIENT HEALTH QUESTIONNAIRE - PHQ9
SUM OF ALL RESPONSES TO PHQ QUESTIONS 1-9: 0
SUM OF ALL RESPONSES TO PHQ QUESTIONS 1-9: 0

## 2023-10-10 ENCOUNTER — VIRTUAL VISIT (OUTPATIENT)
Dept: FAMILY MEDICINE | Facility: CLINIC | Age: 38
End: 2023-10-10

## 2023-10-10 DIAGNOSIS — F90.0 ATTENTION DEFICIT HYPERACTIVITY DISORDER (ADHD), PREDOMINANTLY INATTENTIVE TYPE: ICD-10-CM

## 2023-10-10 PROCEDURE — 99213 OFFICE O/P EST LOW 20 MIN: CPT | Mod: VID | Performed by: NURSE PRACTITIONER

## 2023-10-10 RX ORDER — DEXTROAMPHETAMINE SACCHARATE, AMPHETAMINE ASPARTATE MONOHYDRATE, DEXTROAMPHETAMINE SULFATE AND AMPHETAMINE SULFATE 5; 5; 5; 5 MG/1; MG/1; MG/1; MG/1
20 CAPSULE, EXTENDED RELEASE ORAL DAILY
Qty: 30 CAPSULE | Refills: 0 | Status: SHIPPED | OUTPATIENT
Start: 2023-11-10 | End: 2023-12-10

## 2023-10-10 RX ORDER — DEXTROAMPHETAMINE SACCHARATE, AMPHETAMINE ASPARTATE MONOHYDRATE, DEXTROAMPHETAMINE SULFATE AND AMPHETAMINE SULFATE 5; 5; 5; 5 MG/1; MG/1; MG/1; MG/1
20 CAPSULE, EXTENDED RELEASE ORAL DAILY
Qty: 30 CAPSULE | Refills: 0 | Status: SHIPPED | OUTPATIENT
Start: 2023-10-10 | End: 2023-11-09

## 2023-10-10 RX ORDER — DEXTROAMPHETAMINE SACCHARATE, AMPHETAMINE ASPARTATE, DEXTROAMPHETAMINE SULFATE AND AMPHETAMINE SULFATE 2.5; 2.5; 2.5; 2.5 MG/1; MG/1; MG/1; MG/1
10 TABLET ORAL 2 TIMES DAILY
Qty: 60 TABLET | Refills: 0 | Status: SHIPPED | OUTPATIENT
Start: 2023-10-10 | End: 2023-11-09

## 2023-10-10 RX ORDER — DEXTROAMPHETAMINE SACCHARATE, AMPHETAMINE ASPARTATE MONOHYDRATE, DEXTROAMPHETAMINE SULFATE AND AMPHETAMINE SULFATE 5; 5; 5; 5 MG/1; MG/1; MG/1; MG/1
20 CAPSULE, EXTENDED RELEASE ORAL DAILY
Qty: 30 CAPSULE | Refills: 0 | Status: CANCELLED | OUTPATIENT
Start: 2023-10-10

## 2023-10-10 RX ORDER — DEXTROAMPHETAMINE SACCHARATE, AMPHETAMINE ASPARTATE MONOHYDRATE, DEXTROAMPHETAMINE SULFATE AND AMPHETAMINE SULFATE 5; 5; 5; 5 MG/1; MG/1; MG/1; MG/1
20 CAPSULE, EXTENDED RELEASE ORAL DAILY
Qty: 30 CAPSULE | Refills: 0 | Status: SHIPPED | OUTPATIENT
Start: 2023-12-11 | End: 2024-01-10

## 2023-10-10 RX ORDER — DEXTROAMPHETAMINE SACCHARATE, AMPHETAMINE ASPARTATE, DEXTROAMPHETAMINE SULFATE AND AMPHETAMINE SULFATE 2.5; 2.5; 2.5; 2.5 MG/1; MG/1; MG/1; MG/1
10 TABLET ORAL 2 TIMES DAILY
Qty: 60 TABLET | Refills: 0 | Status: CANCELLED | OUTPATIENT
Start: 2023-10-10

## 2023-10-10 RX ORDER — DEXTROAMPHETAMINE SACCHARATE, AMPHETAMINE ASPARTATE, DEXTROAMPHETAMINE SULFATE AND AMPHETAMINE SULFATE 2.5; 2.5; 2.5; 2.5 MG/1; MG/1; MG/1; MG/1
10 TABLET ORAL 2 TIMES DAILY
Qty: 60 TABLET | Refills: 0 | Status: SHIPPED | OUTPATIENT
Start: 2023-11-10 | End: 2023-12-10

## 2023-10-10 RX ORDER — DEXTROAMPHETAMINE SACCHARATE, AMPHETAMINE ASPARTATE, DEXTROAMPHETAMINE SULFATE AND AMPHETAMINE SULFATE 2.5; 2.5; 2.5; 2.5 MG/1; MG/1; MG/1; MG/1
10 TABLET ORAL 2 TIMES DAILY
Qty: 60 TABLET | Refills: 0 | Status: SHIPPED | OUTPATIENT
Start: 2023-12-11 | End: 2024-01-10

## 2023-10-10 NOTE — PROGRESS NOTES
"Amisha is a 38 year old who is being evaluated via a billable video visit.      How would you like to obtain your AVS? MyChart  If the video visit is dropped, the invitation should be resent by: Text to cell phone: [phone]  Will anyone else be joining your video visit? No          Assessment & Plan     Attention deficit hyperactivity disorder (ADHD), predominantly inattentive type   Controlled no change in treatment plan   - amphetamine-dextroamphetamine (ADDERALL XR) 20 MG 24 hr capsule; Take 1 capsule (20 mg) by mouth daily for 30 days  - amphetamine-dextroamphetamine (ADDERALL XR) 20 MG 24 hr capsule; Take 1 capsule (20 mg) by mouth daily for 30 days  - amphetamine-dextroamphetamine (ADDERALL XR) 20 MG 24 hr capsule; Take 1 capsule (20 mg) by mouth daily for 30 days  - amphetamine-dextroamphetamine (ADDERALL) 10 MG tablet; Take 1 tablet (10 mg) by mouth 2 times daily for 30 days  - amphetamine-dextroamphetamine (ADDERALL) 10 MG tablet; Take 1 tablet (10 mg) by mouth 2 times daily for 30 days  - amphetamine-dextroamphetamine (ADDERALL) 10 MG tablet; Take 1 tablet (10 mg) by mouth 2 times daily for 30 days  BMI:   Estimated body mass index is 32.79 kg/m  as calculated from the following:    Height as of 7/7/23: 1.626 m (5' 4\").    Weight as of 7/7/23: 86.6 kg (191 lb).   Weight management plan: Discussed healthy diet and exercise guidelines    See Patient Instructions    ALEENA Bueno CNP  [facility]    Subjective   Amisha is a 38 year old, presenting for the following health issues:  No chief complaint on file.        10/10/2023    11:44 AM   Additional Questions   Roomed by Saida ACE     Medication Followup of adderall  Taking Medication as prescribed: yes  Side Effects:  None  Medication Helping Symptoms:  yes          Review of Systems   Constitutional, HEENT, cardiovascular, pulmonary, gi and gu systems are negative, except as otherwise noted.      Objective     "       Vitals:  No vitals were obtained today due to virtual visit.    Physical Exam   GENERAL: Healthy, alert and no distress  EYES: Eyes grossly normal to inspection.  No discharge or erythema, or obvious scleral/conjunctival abnormalities.  RESP: No audible wheeze, cough, or visible cyanosis.  No visible retractions or increased work of breathing.    SKIN: Visible skin clear. No significant rash, abnormal pigmentation or lesions.  NEURO: Cranial nerves grossly intact.  Mentation and speech appropriate for age.  PSYCH: Mentation appears normal, affect normal/bright, judgement and insight intact, normal speech and appearance well-groomed.    No results found for any visits on 10/10/23.            Video-Visit Details    Type of service:  Video Visit   Video Start Time: 12:19 PM  Video End Time:12/29    Originating Location (pt. Location): Home    Distant Location (provider location):  On-site  Platform used for Video Visit: [employer]

## 2024-01-08 ENCOUNTER — VIRTUAL VISIT (OUTPATIENT)
Dept: FAMILY MEDICINE | Facility: CLINIC | Age: 39
End: 2024-01-08
Payer: COMMERCIAL

## 2024-01-08 DIAGNOSIS — F90.0 ATTENTION DEFICIT HYPERACTIVITY DISORDER (ADHD), PREDOMINANTLY INATTENTIVE TYPE: ICD-10-CM

## 2024-01-08 PROCEDURE — 99213 OFFICE O/P EST LOW 20 MIN: CPT | Mod: 95 | Performed by: NURSE PRACTITIONER

## 2024-01-08 RX ORDER — DEXTROAMPHETAMINE SACCHARATE, AMPHETAMINE ASPARTATE, DEXTROAMPHETAMINE SULFATE AND AMPHETAMINE SULFATE 2.5; 2.5; 2.5; 2.5 MG/1; MG/1; MG/1; MG/1
10 TABLET ORAL DAILY
Qty: 30 TABLET | Refills: 0 | Status: SHIPPED | OUTPATIENT
Start: 2024-03-10 | End: 2024-01-19

## 2024-01-08 RX ORDER — DEXTROAMPHETAMINE SACCHARATE, AMPHETAMINE ASPARTATE, DEXTROAMPHETAMINE SULFATE AND AMPHETAMINE SULFATE 2.5; 2.5; 2.5; 2.5 MG/1; MG/1; MG/1; MG/1
10 TABLET ORAL 2 TIMES DAILY
Qty: 60 TABLET | Refills: 0 | Status: CANCELLED | OUTPATIENT
Start: 2024-01-08

## 2024-01-08 RX ORDER — DEXTROAMPHETAMINE SACCHARATE, AMPHETAMINE ASPARTATE, DEXTROAMPHETAMINE SULFATE AND AMPHETAMINE SULFATE 2.5; 2.5; 2.5; 2.5 MG/1; MG/1; MG/1; MG/1
10 TABLET ORAL DAILY
Qty: 30 TABLET | Refills: 0 | Status: SHIPPED | OUTPATIENT
Start: 2024-02-08 | End: 2024-01-19

## 2024-01-08 RX ORDER — DEXTROAMPHETAMINE SACCHARATE, AMPHETAMINE ASPARTATE MONOHYDRATE, DEXTROAMPHETAMINE SULFATE AND AMPHETAMINE SULFATE 5; 5; 5; 5 MG/1; MG/1; MG/1; MG/1
20 CAPSULE, EXTENDED RELEASE ORAL DAILY
Qty: 30 CAPSULE | Refills: 0 | Status: SHIPPED | OUTPATIENT
Start: 2024-03-10 | End: 2024-04-09

## 2024-01-08 RX ORDER — DEXTROAMPHETAMINE SACCHARATE, AMPHETAMINE ASPARTATE, DEXTROAMPHETAMINE SULFATE AND AMPHETAMINE SULFATE 2.5; 2.5; 2.5; 2.5 MG/1; MG/1; MG/1; MG/1
10 TABLET ORAL DAILY
Qty: 30 TABLET | Refills: 0 | Status: SHIPPED | OUTPATIENT
Start: 2024-01-08 | End: 2024-02-07

## 2024-01-08 RX ORDER — DEXTROAMPHETAMINE SACCHARATE, AMPHETAMINE ASPARTATE MONOHYDRATE, DEXTROAMPHETAMINE SULFATE AND AMPHETAMINE SULFATE 5; 5; 5; 5 MG/1; MG/1; MG/1; MG/1
20 CAPSULE, EXTENDED RELEASE ORAL DAILY
Qty: 30 CAPSULE | Refills: 0 | Status: SHIPPED | OUTPATIENT
Start: 2024-02-08 | End: 2024-03-09

## 2024-01-08 RX ORDER — DEXTROAMPHETAMINE SACCHARATE, AMPHETAMINE ASPARTATE MONOHYDRATE, DEXTROAMPHETAMINE SULFATE AND AMPHETAMINE SULFATE 5; 5; 5; 5 MG/1; MG/1; MG/1; MG/1
20 CAPSULE, EXTENDED RELEASE ORAL DAILY
Qty: 30 CAPSULE | Refills: 0 | Status: SHIPPED | OUTPATIENT
Start: 2024-01-08 | End: 2024-02-07

## 2024-01-08 RX ORDER — DEXTROAMPHETAMINE SACCHARATE, AMPHETAMINE ASPARTATE MONOHYDRATE, DEXTROAMPHETAMINE SULFATE AND AMPHETAMINE SULFATE 5; 5; 5; 5 MG/1; MG/1; MG/1; MG/1
20 CAPSULE, EXTENDED RELEASE ORAL DAILY
Qty: 30 CAPSULE | Refills: 0 | Status: CANCELLED | OUTPATIENT
Start: 2024-01-08

## 2024-01-08 NOTE — PROGRESS NOTES
Amisha is a 38 year old who is being evaluated via a billable video visit.      How would you like to obtain your AVS? MyChart  If the video visit is dropped, the invitation should be resent by: Text to cell phone: [phone]  Will anyone else be joining your video visit? No          Assessment & Plan     Attention deficit hyperactivity disorder (ADHD), predominantly inattentive type   Controlled no change in treatment plan   - amphetamine-dextroamphetamine (ADDERALL XR) 20 MG 24 hr capsule  Dispense: 30 capsule; Refill: 0  - amphetamine-dextroamphetamine (ADDERALL XR) 20 MG 24 hr capsule  Dispense: 30 capsule; Refill: 0  - amphetamine-dextroamphetamine (ADDERALL XR) 20 MG 24 hr capsule  Dispense: 30 capsule; Refill: 0  - amphetamine-dextroamphetamine (ADDERALL) 10 MG tablet  Dispense: 30 tablet; Refill: 0  - amphetamine-dextroamphetamine (ADDERALL) 10 MG tablet  Dispense: 30 tablet; Refill: 0  - amphetamine-dextroamphetamine (ADDERALL) 10 MG tablet  Dispense: 30 tablet; Refill: 0      ALEENA Bueno [facility]    Tita Lion is a 38 year old, presenting for the following health issues:  A.D.H.D      1/8/2024     9:55 AM   Additional Questions   Roomed by Saida ACE     Medication Followup of Adderall  Taking Medication as prescribed: yes  Side Effects:  None  Medication Helping Symptoms:  yes      Review of Systems   Constitutional, HEENT, cardiovascular, pulmonary, gi and gu systems are negative, except as otherwise noted.      Objective           Vitals:  No vitals were obtained today due to virtual visit.    Physical Exam   GENERAL: Healthy, alert and no distress  EYES: Eyes grossly normal to inspection.  No discharge or erythema, or obvious scleral/conjunctival abnormalities.  RESP: No audible wheeze, cough, or visible cyanosis.  No visible retractions or increased work of breathing.    SKIN: Visible skin clear. No significant rash, abnormal pigmentation or  lesions.  NEURO: Cranial nerves grossly intact.  Mentation and speech appropriate for age.  PSYCH: Mentation appears normal, affect normal/bright, judgement and insight intact, normal speech and appearance well-groomed.    No results found for any visits on 01/08/24.            Video-Visit Details    Type of service:  Video Visit     Originating Location (pt. Location): Home    Distant Location (provider location):  On-site  Platform used for Video Visit: Judy

## 2024-01-18 ENCOUNTER — MYC MEDICAL ADVICE (OUTPATIENT)
Dept: FAMILY MEDICINE | Facility: CLINIC | Age: 39
End: 2024-01-18
Payer: COMMERCIAL

## 2024-01-18 DIAGNOSIS — F90.0 ATTENTION DEFICIT HYPERACTIVITY DISORDER (ADHD), PREDOMINANTLY INATTENTIVE TYPE: ICD-10-CM

## 2024-01-19 RX ORDER — DEXTROAMPHETAMINE SACCHARATE, AMPHETAMINE ASPARTATE, DEXTROAMPHETAMINE SULFATE AND AMPHETAMINE SULFATE 2.5; 2.5; 2.5; 2.5 MG/1; MG/1; MG/1; MG/1
10 TABLET ORAL 2 TIMES DAILY
Qty: 60 TABLET | Refills: 0 | Status: SHIPPED | OUTPATIENT
Start: 2024-02-08 | End: 2024-03-26

## 2024-01-19 RX ORDER — DEXTROAMPHETAMINE SACCHARATE, AMPHETAMINE ASPARTATE, DEXTROAMPHETAMINE SULFATE AND AMPHETAMINE SULFATE 2.5; 2.5; 2.5; 2.5 MG/1; MG/1; MG/1; MG/1
10 TABLET ORAL 2 TIMES DAILY
Qty: 30 TABLET | Refills: 0 | Status: SHIPPED | OUTPATIENT
Start: 2024-01-19 | End: 2024-07-09

## 2024-01-19 NOTE — TELEPHONE ENCOUNTER
Ekta- please see pt MyChart message confirming takes Adderall 10 mg BID, not daily as prescribed.   Please advise.  EBONIE Holly RN

## 2024-02-03 ENCOUNTER — HEALTH MAINTENANCE LETTER (OUTPATIENT)
Age: 39
End: 2024-02-03

## 2024-03-03 DIAGNOSIS — S13.9XXA SPRAIN OF NECK, INITIAL ENCOUNTER: ICD-10-CM

## 2024-03-03 RX ORDER — CYCLOBENZAPRINE HCL 10 MG
10 TABLET ORAL 3 TIMES DAILY PRN
Qty: 40 TABLET | Refills: 3 | Status: SHIPPED | OUTPATIENT
Start: 2024-03-03 | End: 2024-07-09

## 2024-03-23 ASSESSMENT — ANXIETY QUESTIONNAIRES
2. NOT BEING ABLE TO STOP OR CONTROL WORRYING: NOT AT ALL
3. WORRYING TOO MUCH ABOUT DIFFERENT THINGS: NOT AT ALL
IF YOU CHECKED OFF ANY PROBLEMS ON THIS QUESTIONNAIRE, HOW DIFFICULT HAVE THESE PROBLEMS MADE IT FOR YOU TO DO YOUR WORK, TAKE CARE OF THINGS AT HOME, OR GET ALONG WITH OTHER PEOPLE: NOT DIFFICULT AT ALL
7. FEELING AFRAID AS IF SOMETHING AWFUL MIGHT HAPPEN: NOT AT ALL
4. TROUBLE RELAXING: NOT AT ALL
8. IF YOU CHECKED OFF ANY PROBLEMS, HOW DIFFICULT HAVE THESE MADE IT FOR YOU TO DO YOUR WORK, TAKE CARE OF THINGS AT HOME, OR GET ALONG WITH OTHER PEOPLE?: NOT DIFFICULT AT ALL
6. BECOMING EASILY ANNOYED OR IRRITABLE: NOT AT ALL
7. FEELING AFRAID AS IF SOMETHING AWFUL MIGHT HAPPEN: NOT AT ALL
1. FEELING NERVOUS, ANXIOUS, OR ON EDGE: NOT AT ALL
GAD7 TOTAL SCORE: 0
GAD7 TOTAL SCORE: 0
5. BEING SO RESTLESS THAT IT IS HARD TO SIT STILL: NOT AT ALL

## 2024-03-26 ENCOUNTER — VIRTUAL VISIT (OUTPATIENT)
Dept: FAMILY MEDICINE | Facility: CLINIC | Age: 39
End: 2024-03-26
Payer: COMMERCIAL

## 2024-03-26 DIAGNOSIS — F33.1 MAJOR DEPRESSIVE DISORDER, RECURRENT EPISODE, MODERATE (H): ICD-10-CM

## 2024-03-26 DIAGNOSIS — F90.0 ATTENTION DEFICIT HYPERACTIVITY DISORDER (ADHD), PREDOMINANTLY INATTENTIVE TYPE: Primary | ICD-10-CM

## 2024-03-26 PROCEDURE — 99214 OFFICE O/P EST MOD 30 MIN: CPT | Mod: 95 | Performed by: NURSE PRACTITIONER

## 2024-03-26 PROCEDURE — G2211 COMPLEX E/M VISIT ADD ON: HCPCS | Mod: 95 | Performed by: NURSE PRACTITIONER

## 2024-03-26 RX ORDER — DEXTROAMPHETAMINE SACCHARATE, AMPHETAMINE ASPARTATE, DEXTROAMPHETAMINE SULFATE AND AMPHETAMINE SULFATE 2.5; 2.5; 2.5; 2.5 MG/1; MG/1; MG/1; MG/1
10 TABLET ORAL DAILY
Qty: 30 TABLET | Refills: 0 | Status: SHIPPED | OUTPATIENT
Start: 2024-05-07 | End: 2024-06-06

## 2024-03-26 RX ORDER — DEXTROAMPHETAMINE SACCHARATE, AMPHETAMINE ASPARTATE MONOHYDRATE, DEXTROAMPHETAMINE SULFATE AND AMPHETAMINE SULFATE 5; 5; 5; 5 MG/1; MG/1; MG/1; MG/1
20 CAPSULE, EXTENDED RELEASE ORAL DAILY
Qty: 30 CAPSULE | Refills: 0 | Status: SHIPPED | OUTPATIENT
Start: 2024-06-07 | End: 2024-07-07

## 2024-03-26 RX ORDER — DEXTROAMPHETAMINE SACCHARATE, AMPHETAMINE ASPARTATE MONOHYDRATE, DEXTROAMPHETAMINE SULFATE AND AMPHETAMINE SULFATE 5; 5; 5; 5 MG/1; MG/1; MG/1; MG/1
20 CAPSULE, EXTENDED RELEASE ORAL DAILY
Qty: 30 CAPSULE | Refills: 0 | Status: SHIPPED | OUTPATIENT
Start: 2024-05-07 | End: 2024-06-06

## 2024-03-26 RX ORDER — DEXTROAMPHETAMINE SACCHARATE, AMPHETAMINE ASPARTATE MONOHYDRATE, DEXTROAMPHETAMINE SULFATE AND AMPHETAMINE SULFATE 5; 5; 5; 5 MG/1; MG/1; MG/1; MG/1
20 CAPSULE, EXTENDED RELEASE ORAL DAILY
Qty: 30 CAPSULE | Refills: 0 | Status: SHIPPED | OUTPATIENT
Start: 2024-04-07 | End: 2024-05-07

## 2024-03-26 RX ORDER — DEXTROAMPHETAMINE SACCHARATE, AMPHETAMINE ASPARTATE, DEXTROAMPHETAMINE SULFATE AND AMPHETAMINE SULFATE 2.5; 2.5; 2.5; 2.5 MG/1; MG/1; MG/1; MG/1
10 TABLET ORAL DAILY
Qty: 30 TABLET | Refills: 0 | Status: SHIPPED | OUTPATIENT
Start: 2024-06-07 | End: 2024-07-07

## 2024-03-26 ASSESSMENT — PATIENT HEALTH QUESTIONNAIRE - PHQ9: SUM OF ALL RESPONSES TO PHQ QUESTIONS 1-9: 0

## 2024-03-26 NOTE — PROGRESS NOTES
Amisha is a 39 year old who is being evaluated via a billable video visit.    How would you like to obtain your AVS? MyChart  If the video visit is dropped, the invitation should be resent by: Text to cell phone: [phone]  Will anyone else be joining your video visit? No      Assessment & Plan   (F90.0) Attention deficit hyperactivity disorder (ADHD), predominantly inattentive type  (primary encounter diagnosis)  Comment:  Controlled no change in treatment plan   Plan: amphetamine-dextroamphetamine (ADDERALL XR) 20         MG 24 hr capsule, amphetamine-dextroamphetamine        (ADDERALL XR) 20 MG 24 hr capsule,         amphetamine-dextroamphetamine (ADDERALL XR) 20         MG 24 hr capsule, amphetamine-dextroamphetamine        (ADDERALL) 10 MG tablet,         amphetamine-dextroamphetamine (ADDERALL) 10 MG         tablet, amphetamine-dextroamphetamine         (ADDERALL) 10 MG tablet      (F33.1) Major depressive disorder, recurrent episode, moderate (H)  Comment: Stable   Plan:   - amphetamine-dextroamphetamine (ADDERALL) 10 MG tablet; Take 1 tablet (10 mg) by mouth daily for 30 days      Subjective   Amisha is a 39 year old, presenting for the following health issues:  No chief complaint on file.      3/26/2024    11:09 AM   Additional Questions   Roomed by Saida GAUTAM     Video Start Time: 11:51 AM    History of Present Illness       Reason for visit:  Medication refill    She eats 2-3 servings of fruits and vegetables daily.She consumes 1 sweetened beverage(s) daily.She exercises with enough effort to increase her heart rate 20 to 29 minutes per day.  She exercises with enough effort to increase her heart rate 4 days per week.   She is taking medications regularly.       Medication Followup of Adderall  Taking Medication as prescribed: yes  Side Effects:  None  Medication Helping Symptoms:  yes          Review of Systems  Constitutional, HEENT, cardiovascular, pulmonary, gi and gu systems are negative, except as  otherwise noted.      Objective           Vitals:  No vitals were obtained today due to virtual visit.    Physical Exam   GENERAL: alert and no distress  EYES: Eyes grossly normal to inspection.  No discharge or erythema, or obvious scleral/conjunctival abnormalities.  RESP: No audible wheeze, cough, or visible cyanosis.    SKIN: Visible skin clear. No significant rash, abnormal pigmentation or lesions.  NEURO: Cranial nerves grossly intact.  Mentation and speech appropriate for age.          Video-Visit Details    Type of service:  Video Visit   Video End Time:11:59 AM  Originating Location (pt. Location): Home    Distant Location (provider location):  On-site  Platform used for Video Visit: Hardy  Signed Electronically by: ALEENA Bueno CNP

## 2024-04-17 ENCOUNTER — OFFICE VISIT (OUTPATIENT)
Dept: OBGYN | Facility: CLINIC | Age: 39
End: 2024-04-17
Payer: COMMERCIAL

## 2024-04-17 VITALS
WEIGHT: 200 LBS | RESPIRATION RATE: 16 BRPM | HEART RATE: 99 BPM | BODY MASS INDEX: 34.15 KG/M2 | TEMPERATURE: 98.5 F | HEIGHT: 64 IN | SYSTOLIC BLOOD PRESSURE: 135 MMHG | DIASTOLIC BLOOD PRESSURE: 89 MMHG

## 2024-04-17 DIAGNOSIS — Z30.433 ENCOUNTER FOR REMOVAL AND REINSERTION OF INTRAUTERINE CONTRACEPTIVE DEVICE: Primary | ICD-10-CM

## 2024-04-17 DIAGNOSIS — Z30.430 ENCOUNTER FOR INSERTION OF MIRENA IUD: ICD-10-CM

## 2024-04-17 LAB
HCG UR QL: NEGATIVE
INTERNAL QC OK POCT: NORMAL
POCT KIT EXPIRATION DATE: NORMAL
POCT KIT LOT NUMBER: NORMAL

## 2024-04-17 PROCEDURE — 81025 URINE PREGNANCY TEST: CPT | Performed by: OBSTETRICS & GYNECOLOGY

## 2024-04-17 PROCEDURE — 58300 INSERT INTRAUTERINE DEVICE: CPT | Performed by: OBSTETRICS & GYNECOLOGY

## 2024-04-17 PROCEDURE — 58301 REMOVE INTRAUTERINE DEVICE: CPT | Performed by: OBSTETRICS & GYNECOLOGY

## 2024-04-17 NOTE — PROGRESS NOTES
[facility]    IUD Removal and Replacement Procedure Note      Amisha Smith  1985  [id number]    Indications:    Amisha Smith is a 39 year old year old female, who is here today for IUD removal and replacement. Has had multiple IUDs in the past and has tolerated well. Her menses have returned and she is requesting IUD exchange.     The patient was counseled on the risks (including including risk of infection, uterine perforation, cramping), benefits (high efficacy, low maintenance birth control, reduced risk of uterine cancer and hyperplasia, improvement in menstrual bleeding), and alternatives of the procedure. Previous Mirena IUD was placed in 2018. Desires to continue this method.       Procedure:  Verbal and written consent were obtained. A UPT was negative prior to the procedure. The patient was placed in the dorsal lithotomy position.  A speculum was placed in the vagina and the cervix was visualized. The IUD strings were not initially visible. The cervix was cleaned with betadine swabs. A cytobrush was used to extract the strings from within the cervical canal. The IUD strings were then grasped with a forceps and IUD was removed intact. The IUD was placed in a sterile cup and disposed of. A Single-tooth tenaculum was placed on the cervix to stabilize the cervix. The Mirena IUD was loaded, advanced to the fundus, pulled back 1cm, deployed and advanced to the fundus. Using the insertor as a sound, the uterus sounded to 7.5 cm. IUD strings were cut 2-3cm from the external cervical os. EBL: 0cc.       Post Procedure:    The patient tolerated the procedure fairly well. No complications.    Patient was given post procedure instructions and return precautions.       Olga Young DO

## 2024-06-03 ENCOUNTER — OFFICE VISIT (OUTPATIENT)
Dept: FAMILY MEDICINE | Facility: CLINIC | Age: 39
End: 2024-06-03
Payer: COMMERCIAL

## 2024-06-03 VITALS
SYSTOLIC BLOOD PRESSURE: 130 MMHG | TEMPERATURE: 97.8 F | HEART RATE: 110 BPM | DIASTOLIC BLOOD PRESSURE: 86 MMHG | OXYGEN SATURATION: 98 % | RESPIRATION RATE: 16 BRPM | BODY MASS INDEX: 34.31 KG/M2 | WEIGHT: 201 LBS | HEIGHT: 64 IN

## 2024-06-03 DIAGNOSIS — M72.2 PLANTAR FASCIITIS: Primary | ICD-10-CM

## 2024-06-03 PROCEDURE — 99213 OFFICE O/P EST LOW 20 MIN: CPT | Performed by: FAMILY MEDICINE

## 2024-06-03 ASSESSMENT — PAIN SCALES - GENERAL: PAINLEVEL: EXTREME PAIN (8)

## 2024-06-03 NOTE — PROGRESS NOTES
"      Tita Lion is a 39 year old, presenting for the following health issues:  Foot Pain    History of Present Illness       Reason for visit:  Foot pain  Symptom onset:  3-4 weeks ago  Symptoms include:  Pain  Symptom intensity:  Moderate  Symptom progression:  Worsening  Had these symptoms before:  No  What makes it worse:  Bearing weight  What makes it better:  Ice, resting foot, stretching    She eats 2-3 servings of fruits and vegetables daily.She consumes 0 sweetened beverage(s) daily.She exercises with enough effort to increase her heart rate 20 to 29 minutes per day.  She exercises with enough effort to increase her heart rate 4 days per week.   She is taking medications regularly.         Objective    /86   Pulse 110   Temp 97.8  F (36.6  C) (Tympanic)   Resp 16   Ht 1.626 m (5' 4\")   Wt 91.2 kg (201 lb)   LMP  (LMP Unknown)   SpO2 98%   BMI 34.50 kg/m    There is no height or weight on file to calculate BMI.  Physical Exam   Tender plantar fascia area L foot    A :plantar fasciitis    P; discussed stretching, offloading, comfortable soft shoes.  She will try her best.  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          Signed Electronically by: Omar Forbes MD    "

## 2024-07-06 SDOH — HEALTH STABILITY: PHYSICAL HEALTH: ON AVERAGE, HOW MANY DAYS PER WEEK DO YOU ENGAGE IN MODERATE TO STRENUOUS EXERCISE (LIKE A BRISK WALK)?: 3 DAYS

## 2024-07-06 SDOH — HEALTH STABILITY: PHYSICAL HEALTH: ON AVERAGE, HOW MANY MINUTES DO YOU ENGAGE IN EXERCISE AT THIS LEVEL?: 30 MIN

## 2024-07-06 ASSESSMENT — SOCIAL DETERMINANTS OF HEALTH (SDOH): HOW OFTEN DO YOU GET TOGETHER WITH FRIENDS OR RELATIVES?: TWICE A WEEK

## 2024-07-09 ENCOUNTER — OFFICE VISIT (OUTPATIENT)
Dept: FAMILY MEDICINE | Facility: CLINIC | Age: 39
End: 2024-07-09
Payer: COMMERCIAL

## 2024-07-09 VITALS
SYSTOLIC BLOOD PRESSURE: 134 MMHG | HEIGHT: 64 IN | DIASTOLIC BLOOD PRESSURE: 82 MMHG | HEART RATE: 101 BPM | TEMPERATURE: 97.4 F | OXYGEN SATURATION: 98 % | RESPIRATION RATE: 20 BRPM | WEIGHT: 202 LBS | BODY MASS INDEX: 34.49 KG/M2

## 2024-07-09 DIAGNOSIS — Z00.00 ROUTINE GENERAL MEDICAL EXAMINATION AT A HEALTH CARE FACILITY: Primary | ICD-10-CM

## 2024-07-09 DIAGNOSIS — F90.0 ATTENTION DEFICIT HYPERACTIVITY DISORDER (ADHD), PREDOMINANTLY INATTENTIVE TYPE: ICD-10-CM

## 2024-07-09 DIAGNOSIS — M72.2 PLANTAR FASCIITIS: ICD-10-CM

## 2024-07-09 DIAGNOSIS — G25.81 RESTLESS LEG SYNDROME: ICD-10-CM

## 2024-07-09 DIAGNOSIS — G47.00 INSOMNIA, UNSPECIFIED TYPE: ICD-10-CM

## 2024-07-09 DIAGNOSIS — S13.9XXA SPRAIN OF NECK, INITIAL ENCOUNTER: ICD-10-CM

## 2024-07-09 DIAGNOSIS — Z12.31 VISIT FOR SCREENING MAMMOGRAM: ICD-10-CM

## 2024-07-09 LAB
ALBUMIN SERPL BCG-MCNC: 4.3 G/DL (ref 3.5–5.2)
ALP SERPL-CCNC: 78 U/L (ref 40–150)
ALT SERPL W P-5'-P-CCNC: 17 U/L (ref 0–50)
ANION GAP SERPL CALCULATED.3IONS-SCNC: 12 MMOL/L (ref 7–15)
AST SERPL W P-5'-P-CCNC: 19 U/L (ref 0–45)
BILIRUB SERPL-MCNC: 0.6 MG/DL
BUN SERPL-MCNC: 8.2 MG/DL (ref 6–20)
CALCIUM SERPL-MCNC: 9 MG/DL (ref 8.6–10)
CHLORIDE SERPL-SCNC: 102 MMOL/L (ref 98–107)
CHOLEST SERPL-MCNC: 213 MG/DL
CREAT SERPL-MCNC: 0.62 MG/DL (ref 0.51–0.95)
DEPRECATED HCO3 PLAS-SCNC: 22 MMOL/L (ref 22–29)
EGFRCR SERPLBLD CKD-EPI 2021: >90 ML/MIN/1.73M2
ERYTHROCYTE [DISTWIDTH] IN BLOOD BY AUTOMATED COUNT: 12.2 % (ref 10–15)
FASTING STATUS PATIENT QL REPORTED: YES
FASTING STATUS PATIENT QL REPORTED: YES
GLUCOSE SERPL-MCNC: 95 MG/DL (ref 70–99)
HCT VFR BLD AUTO: 42.5 % (ref 35–47)
HDLC SERPL-MCNC: 58 MG/DL
HGB BLD-MCNC: 13.4 G/DL (ref 11.7–15.7)
IRON BINDING CAPACITY (ROCHE): 367 UG/DL (ref 240–430)
IRON SATN MFR SERPL: 30 % (ref 15–46)
IRON SERPL-MCNC: 110 UG/DL (ref 37–145)
LDLC SERPL CALC-MCNC: 138 MG/DL
MAGNESIUM SERPL-MCNC: 2.1 MG/DL (ref 1.7–2.3)
MCH RBC QN AUTO: 31 PG (ref 26.5–33)
MCHC RBC AUTO-ENTMCNC: 31.5 G/DL (ref 31.5–36.5)
MCV RBC AUTO: 98 FL (ref 78–100)
NONHDLC SERPL-MCNC: 155 MG/DL
PLATELET # BLD AUTO: 268 10E3/UL (ref 150–450)
POTASSIUM SERPL-SCNC: 4.8 MMOL/L (ref 3.4–5.3)
PROT SERPL-MCNC: 6.9 G/DL (ref 6.4–8.3)
RBC # BLD AUTO: 4.32 10E6/UL (ref 3.8–5.2)
SODIUM SERPL-SCNC: 136 MMOL/L (ref 135–145)
TRIGL SERPL-MCNC: 83 MG/DL
WBC # BLD AUTO: 8.6 10E3/UL (ref 4–11)

## 2024-07-09 PROCEDURE — 83735 ASSAY OF MAGNESIUM: CPT | Performed by: NURSE PRACTITIONER

## 2024-07-09 PROCEDURE — 80061 LIPID PANEL: CPT | Performed by: NURSE PRACTITIONER

## 2024-07-09 PROCEDURE — 36415 COLL VENOUS BLD VENIPUNCTURE: CPT | Performed by: NURSE PRACTITIONER

## 2024-07-09 PROCEDURE — 83540 ASSAY OF IRON: CPT | Performed by: NURSE PRACTITIONER

## 2024-07-09 PROCEDURE — 99396 PREV VISIT EST AGE 40-64: CPT | Performed by: NURSE PRACTITIONER

## 2024-07-09 PROCEDURE — 99213 OFFICE O/P EST LOW 20 MIN: CPT | Mod: 25 | Performed by: NURSE PRACTITIONER

## 2024-07-09 PROCEDURE — 80053 COMPREHEN METABOLIC PANEL: CPT | Performed by: NURSE PRACTITIONER

## 2024-07-09 PROCEDURE — 82607 VITAMIN B-12: CPT | Performed by: NURSE PRACTITIONER

## 2024-07-09 PROCEDURE — 85027 COMPLETE CBC AUTOMATED: CPT | Performed by: NURSE PRACTITIONER

## 2024-07-09 PROCEDURE — 82306 VITAMIN D 25 HYDROXY: CPT | Performed by: NURSE PRACTITIONER

## 2024-07-09 PROCEDURE — 83550 IRON BINDING TEST: CPT | Performed by: NURSE PRACTITIONER

## 2024-07-09 RX ORDER — DEXTROAMPHETAMINE SACCHARATE, AMPHETAMINE ASPARTATE MONOHYDRATE, DEXTROAMPHETAMINE SULFATE AND AMPHETAMINE SULFATE 5; 5; 5; 5 MG/1; MG/1; MG/1; MG/1
20 CAPSULE, EXTENDED RELEASE ORAL DAILY
Qty: 30 CAPSULE | Refills: 0 | Status: SHIPPED | OUTPATIENT
Start: 2024-09-07 | End: 2024-10-07

## 2024-07-09 RX ORDER — DEXTROAMPHETAMINE SACCHARATE, AMPHETAMINE ASPARTATE, DEXTROAMPHETAMINE SULFATE AND AMPHETAMINE SULFATE 2.5; 2.5; 2.5; 2.5 MG/1; MG/1; MG/1; MG/1
10 TABLET ORAL DAILY
Qty: 30 TABLET | Refills: 0 | Status: SHIPPED | OUTPATIENT
Start: 2024-08-08 | End: 2024-09-07

## 2024-07-09 RX ORDER — TRAZODONE HYDROCHLORIDE 150 MG/1
TABLET ORAL
Qty: 135 TABLET | Refills: 3 | Status: SHIPPED | OUTPATIENT
Start: 2024-07-09

## 2024-07-09 RX ORDER — DEXTROAMPHETAMINE SACCHARATE, AMPHETAMINE ASPARTATE MONOHYDRATE, DEXTROAMPHETAMINE SULFATE AND AMPHETAMINE SULFATE 5; 5; 5; 5 MG/1; MG/1; MG/1; MG/1
20 CAPSULE, EXTENDED RELEASE ORAL DAILY
Qty: 30 CAPSULE | Refills: 0 | Status: SHIPPED | OUTPATIENT
Start: 2024-08-08 | End: 2024-09-07

## 2024-07-09 RX ORDER — DEXTROAMPHETAMINE SACCHARATE, AMPHETAMINE ASPARTATE, DEXTROAMPHETAMINE SULFATE AND AMPHETAMINE SULFATE 2.5; 2.5; 2.5; 2.5 MG/1; MG/1; MG/1; MG/1
10 TABLET ORAL DAILY
Status: CANCELLED | OUTPATIENT
Start: 2024-07-09

## 2024-07-09 RX ORDER — DEXTROAMPHETAMINE SACCHARATE, AMPHETAMINE ASPARTATE, DEXTROAMPHETAMINE SULFATE AND AMPHETAMINE SULFATE 2.5; 2.5; 2.5; 2.5 MG/1; MG/1; MG/1; MG/1
10 TABLET ORAL DAILY
Qty: 30 TABLET | Refills: 0 | Status: SHIPPED | OUTPATIENT
Start: 2024-09-07 | End: 2024-10-07

## 2024-07-09 RX ORDER — GABAPENTIN 100 MG/1
100-300 CAPSULE ORAL
Qty: 90 CAPSULE | Refills: 1 | Status: SHIPPED | OUTPATIENT
Start: 2024-07-09 | End: 2024-09-10

## 2024-07-09 RX ORDER — METHOCARBAMOL 500 MG/1
500 TABLET, FILM COATED ORAL 2 TIMES DAILY PRN
Qty: 60 TABLET | Refills: 4 | Status: SHIPPED | OUTPATIENT
Start: 2024-07-09

## 2024-07-09 RX ORDER — DEXTROAMPHETAMINE SACCHARATE, AMPHETAMINE ASPARTATE, DEXTROAMPHETAMINE SULFATE AND AMPHETAMINE SULFATE 2.5; 2.5; 2.5; 2.5 MG/1; MG/1; MG/1; MG/1
10 TABLET ORAL DAILY
Qty: 30 TABLET | Refills: 0 | Status: SHIPPED | OUTPATIENT
Start: 2024-07-09 | End: 2024-08-08

## 2024-07-09 RX ORDER — DEXTROAMPHETAMINE SACCHARATE, AMPHETAMINE ASPARTATE MONOHYDRATE, DEXTROAMPHETAMINE SULFATE AND AMPHETAMINE SULFATE 5; 5; 5; 5 MG/1; MG/1; MG/1; MG/1
20 CAPSULE, EXTENDED RELEASE ORAL DAILY
Qty: 30 CAPSULE | Refills: 0 | Status: SHIPPED | OUTPATIENT
Start: 2024-07-09 | End: 2024-08-08

## 2024-07-09 ASSESSMENT — PAIN SCALES - GENERAL: PAINLEVEL: NO PAIN (0)

## 2024-07-09 NOTE — PROGRESS NOTES
Preventive Care Visit  [facility]  ALEENA Bueno CNP, Family Medicine  Jul 9, 2024      Assessment & Plan     Routine general medical examination at a health care facility  Routine medical exam labs obtained patient will schedule mammogram  - REVIEW OF HEALTH MAINTENANCE PROTOCOL ORDERS  - Comprehensive metabolic panel (BMP + Alb, Alk Phos, ALT, AST, Total. Bili, TP); Future  - CBC with platelets; Future  - Iron and iron binding capacity; Future  - Vitamin B12; Future  - Vitamin D Deficiency; Future  - MA Screen Bilateral w/Shaggy; Future  - Lipid panel reflex to direct LDL Non-fasting; Future  - Lipid panel reflex to direct LDL Non-fasting  - Vitamin D Deficiency  - Vitamin B12  - Iron and iron binding capacity  - CBC with platelets  - Comprehensive metabolic panel (BMP + Alb, Alk Phos, ALT, AST, Total. Bili, TP)    Insomnia, unspecified type  Uncontrolled insomnia we will start trazodone  - traZODone (DESYREL) 150 MG tablet; Take 1 and 1/2 tablets (total 225 mg) at bedtime PRN  - OFFICE/OUTPT VISIT,EST,LEVL III    Attention deficit hyperactivity disorder (ADHD), predominantly inattentive type  Stable continue with current medication regiment  - amphetamine-dextroamphetamine (ADDERALL XR) 20 MG 24 hr capsule; Take 1 capsule (20 mg) by mouth daily for 30 days  - amphetamine-dextroamphetamine (ADDERALL XR) 20 MG 24 hr capsule; Take 1 capsule (20 mg) by mouth daily for 30 days  - amphetamine-dextroamphetamine (ADDERALL XR) 20 MG 24 hr capsule; Take 1 capsule (20 mg) by mouth daily for 30 days  - amphetamine-dextroamphetamine (ADDERALL) 10 MG tablet; Take 1 tablet (10 mg) by mouth daily for 30 days  - amphetamine-dextroamphetamine (ADDERALL) 10 MG tablet; Take 1 tablet (10 mg) by mouth daily for 30 days  - amphetamine-dextroamphetamine (ADDERALL) 10 MG tablet; Take 1 tablet (10 mg) by mouth daily for 30 days  - OFFICE/OUTPT VISIT,EST,LEVL III    Visit for screening mammogram      Sprain  "of neck, initial encounter  Stable  - methocarbamol (ROBAXIN) 500 MG tablet; Take 1 tablet (500 mg) by mouth 2 times daily as needed for muscle spasms  - OFFICE/OUTPT VISIT,EST,LEVL III    Restless leg syndrome  Concerning for restless leg syndrome will obtain magnesium and start on gabapentin.  - Magnesium; Future  - gabapentin (NEURONTIN) 100 MG capsule; Take 1-3 capsules (100-300 mg) by mouth nightly as needed for other (RLS)  - Magnesium  - OFFICE/OUTPT VISIT,EST,LEVL III    Plantar fasciitis  History of plantar fasciitis that flares up recommend orthotics  - Orthotics, Mastectomy and Custom Compression Orders  - OFFICE/OUTPT VISIT,EST,LEVL III    Patient has been advised of split billing requirements and indicates understanding: Yes        BMI  Estimated body mass index is 34.67 kg/m  as calculated from the following:    Height as of this encounter: 1.626 m (5' 4\").    Weight as of this encounter: 91.6 kg (202 lb).   Weight management plan: Discussed healthy diet and exercise guidelines    Counseling  Appropriate preventive services were discussed with this patient, including applicable screening as appropriate for fall prevention, nutrition, physical activity, Tobacco-use cessation, weight loss and cognition.  Checklist reviewing preventive services available has been given to the patient.      See Patient Instructions    Tita Lion is a 39 year old, presenting for the following:  Physical        7/9/2024    12:42 PM   Additional Questions   Roomed by [facility] Directive  Patient does not have a Health Care Directive or Living Will: Discussed advance care planning with patient; however, patient declined at this time.    HPI            7/6/2024   General Health   How would you rate your overall physical health? Good   Feel stress (tense, anxious, or unable to sleep) Only a little      (!) STRESS CONCERN      7/6/2024   Nutrition   Three or more servings of calcium each day? Yes   Diet: " Regular (no restrictions)   How many servings of fruit and vegetables per day? (!) 2-3   How many sweetened beverages each day? 0-1            7/6/2024   Exercise   Days per week of moderate/strenous exercise 3 days   Average minutes spent exercising at this level 30 min            7/6/2024   Social Factors   Frequency of gathering with friends or relatives Twice a week   Worry food won't last until get money to buy more No   Food not last or not have enough money for food? No   Do you have housing? (Housing is defined as stable permanent housing and does not include staying ouside in a car, in a tent, in an abandoned building, in an overnight shelter, or couch-surfing.) Yes   Are you worried about losing your housing? No   Lack of transportation? No   Unable to get utilities (heat,electricity)? No            7/6/2024   Dental   Dentist two times every year? (!) NO            7/6/2024   TB Screening   Were you born outside of the [place]? No            Today's PHQ-9 Score:       3/26/2024    11:12 AM   PHQ-9 SCORE   PHQ-9 Total Score 0           7/6/2024   Substance Use   Alcohol more than 3/day or more than 7/wk No   Do you use any other substances recreationally? No        Social History     Tobacco Use    Smoking status: Former     Current packs/day: 0.00     Types: Cigarettes    Smokeless tobacco: Never   Vaping Use    Vaping status: Never Used   Substance Use Topics    Alcohol use: Yes     Comment: rare    Drug use: No          Mammogram Screening - Mammogram every 1-2 years updated in Health Maintenance based on mutual decision making        7/6/2024   STI Screening   New sexual partner(s) since last STI/HIV test? No        History of abnormal Pap smear: No - age 21-29 PAP every 3 years recommended        Latest Ref Rng & Units 1/11/2022     1:34 PM 11/10/2017    12:00 AM   PAP / HPV   PAP  Atypical squamous cells of undetermined significance (ASC-US)     HPV 16 DNA Negative Negative     HPV 18 DNA Negative  Negative     Other HR HPV Negative Negative     PAP-ABSTRACT   See Scanned Document           This result is from an external source.     ASCVD Risk   The 10-year ASCVD risk score (Davey CASTILLO, et al., 2019) is: 0.8%    Values used to calculate the score:      Age: 39 years      Sex: Female      Is Non- : No      Diabetic: No      Tobacco smoker: No      Systolic Blood Pressure: 134 mmHg      Is BP treated: No      HDL Cholesterol: 54 mg/dL      Total Cholesterol: 214 mg/dL        7/6/2024   Contraception/Family Planning   Questions about contraception or family planning No           Reviewed and updated as needed this visit by Provider                    Labs reviewed in EPIC  BP Readings from Last 3 Encounters:   07/09/24 134/82   06/03/24 130/86   04/17/24 135/89    Wt Readings from Last 3 Encounters:   07/09/24 91.6 kg (202 lb)   06/03/24 91.2 kg (201 lb)   04/17/24 90.7 kg (200 lb)                  Patient Active Problem List   Diagnosis    ADHD (attention deficit hyperactivity disorder), combined type    Attention deficit hyperactivity disorder (ADHD), predominantly inattentive type    Major depressive disorder, recurrent episode, moderate (H)    ASCUS of cervix with negative high risk HPV    Encounter for insertion of mirena IUD     No past surgical history on file.    Social History     Tobacco Use    Smoking status: Former     Current packs/day: 0.00     Types: Cigarettes    Smokeless tobacco: Never   Substance Use Topics    Alcohol use: Yes     Comment: rare     Family History   Problem Relation Age of Onset    Eye Disorder Maternal Grandfather     Cancer Maternal Grandfather          Current Outpatient Medications   Medication Sig Dispense Refill    gabapentin (NEURONTIN) 100 MG capsule Take 1-3 capsules (100-300 mg) by mouth nightly as needed for other (RLS) 90 capsule 1    levonorgestrel (MIRENA) 20 MCG/24HR IUD 1 each (20 mcg) by Intrauterine route continuous       "methocarbamol (ROBAXIN) 500 MG tablet Take 1 tablet (500 mg) by mouth 2 times daily as needed for muscle spasms 60 tablet 4    traZODone (DESYREL) 150 MG tablet Take 1 and 1/2 tablets (total 225 mg) at bedtime  tablet 3    triamcinolone (KENALOG) 0.1 % external cream Apply topically 2 times daily 30 g 0     No Known Allergies  Recent Labs   Lab Test 07/07/23  0742 10/18/19  0909   *  --    HDL 54  --    TRIG 89  --    ALT 18  --    CR 0.71  --    GFRESTIMATED >90  --    POTASSIUM 4.6  --    TSH 1.25 0.60          Review of Systems  Constitutional, HEENT, cardiovascular, pulmonary, gi and gu systems are negative, except as otherwise noted.     Objective    Exam  /82 (BP Location: Right arm)   Pulse 101   Temp 97.4  F (36.3  C) (Tympanic)   Resp 20   Ht 1.626 m (5' 4\")   Wt 91.6 kg (202 lb)   LMP 07/09/2019   SpO2 98%   BMI 34.67 kg/m     Estimated body mass index is 34.67 kg/m  as calculated from the following:    Height as of this encounter: 1.626 m (5' 4\").    Weight as of this encounter: 91.6 kg (202 lb).    Physical Exam  GENERAL: alert and no distress  EYES: Eyes grossly normal to inspection, PERRL and conjunctivae and sclerae normal  HENT: ear canals and TM's normal, nose and mouth without ulcers or lesions  NECK: no adenopathy, no asymmetry, masses, or scars  RESP: lungs clear to auscultation - no rales, rhonchi or wheezes  CV: regular rate and rhythm, normal S1 S2, no S3 or S4, no murmur, click or rub, no peripheral edema  ABDOMEN: soft, nontender, no hepatosplenomegaly, no masses and bowel sounds normal  MS: no gross musculoskeletal defects noted, no edema  SKIN: no suspicious lesions or rashes  NEURO: Normal strength and tone, mentation intact and speech normal  PSYCH: mentation appears normal, affect normal/bright        Signed Electronically by: ALEENA Bueno CNP    DME (Durable Medical Equipment) Orders and Documentation  Orders Placed This Encounter   Procedures    " Orthotics, Mastectomy and Custom Compression Orders        The patient was assessed and it was determined the patient is in need of the following listed DME Supplies/Equipment. Please complete supporting documentation below to demonstrate medical necessity.

## 2024-07-09 NOTE — PATIENT INSTRUCTIONS
Patient Education   Preventive Care Advice   This is general advice given by our system to help you stay healthy. However, your care team may have specific advice just for you. Please talk to your care team about your preventive care needs.  Nutrition  Eat 5 or more servings of fruits and vegetables each day.  Try wheat bread, brown rice and whole grain pasta (instead of white bread, rice, and pasta).  Get enough calcium and vitamin D. Check the label on foods and aim for 100% of the RDA (recommended daily allowance).  Lifestyle  Exercise at least 150 minutes each week  (30 minutes a day, 5 days a week).  Do muscle strengthening activities 2 days a week. These help control your weight and prevent disease.  No smoking.  Wear sunscreen to prevent skin cancer.  Have a dental exam and cleaning every 6 months.  Yearly exams  See your health care team every year to talk about:  Any changes in your health.  Any medicines your care team has prescribed.  Preventive care, family planning, and ways to prevent chronic diseases.  Shots (vaccines)   HPV shots (up to age 26), if you've never had them before.  Hepatitis B shots (up to age 59), if you've never had them before.  COVID-19 shot: Get this shot when it's due.  Flu shot: Get a flu shot every year.  Tetanus shot: Get a tetanus shot every 10 years.  Pneumococcal, hepatitis A, and RSV shots: Ask your care team if you need these based on your risk.  Shingles shot (for age 50 and up)  General health tests  Diabetes screening:  Starting at age 35, Get screened for diabetes at least every 3 years.  If you are younger than age 35, ask your care team if you should be screened for diabetes.  Cholesterol test: At age 39, start having a cholesterol test every 5 years, or more often if advised.  Bone density scan (DEXA): At age 50, ask your care team if you should have this scan for osteoporosis (brittle bones).  Hepatitis C: Get tested at least once in your life.  STIs (sexually  transmitted infections)  Before age 24: Ask your care team if you should be screened for STIs.  After age 24: Get screened for STIs if you're at risk. You are at risk for STIs (including HIV) if:  You are sexually active with more than one person.  You don't use condoms every time.  You or a partner was diagnosed with a sexually transmitted infection.  If you are at risk for HIV, ask about PrEP medicine to prevent HIV.  Get tested for HIV at least once in your life, whether you are at risk for HIV or not.  Cancer screening tests  Cervical cancer screening: If you have a cervix, begin getting regular cervical cancer screening tests starting at age 21.  Breast cancer scan (mammogram): If you've ever had breasts, begin having regular mammograms starting at age 40. This is a scan to check for breast cancer.  Colon cancer screening: It is important to start screening for colon cancer at age 45.  Have a colonoscopy test every 10 years (or more often if you're at risk) Or, ask your provider about stool tests like a FIT test every year or Cologuard test every 3 years.  To learn more about your testing options, visit:   .  For help making a decision, visit:   https://bit.ly/bn81688.  Prostate cancer screening test: If you have a prostate, ask your care team if a prostate cancer screening test (PSA) at age 55 is right for you.  Lung cancer screening: If you are a current or former smoker ages 50 to 80, ask your care team if ongoing lung cancer screenings are right for you.  For informational purposes only. Not to replace the advice of your health care provider. Copyright   2023 [employer]. All rights reserved. Clinically reviewed by the [facility] Transitions Program. [employer] 272119 - REV 01/24.

## 2024-07-10 LAB
VIT B12 SERPL-MCNC: 629 PG/ML (ref 232–1245)
VIT D+METAB SERPL-MCNC: 32 NG/ML (ref 20–50)

## 2024-07-17 ENCOUNTER — MYC MEDICAL ADVICE (OUTPATIENT)
Dept: FAMILY MEDICINE | Facility: CLINIC | Age: 39
End: 2024-07-17
Payer: COMMERCIAL

## 2024-07-17 DIAGNOSIS — M72.2 PLANTAR FASCIITIS: Primary | ICD-10-CM

## 2024-09-10 DIAGNOSIS — G25.81 RESTLESS LEG SYNDROME: ICD-10-CM

## 2024-09-10 RX ORDER — GABAPENTIN 100 MG/1
100-300 CAPSULE ORAL
Qty: 90 CAPSULE | Refills: 1 | Status: SHIPPED | OUTPATIENT
Start: 2024-09-10 | End: 2024-09-16

## 2024-09-16 ENCOUNTER — VIRTUAL VISIT (OUTPATIENT)
Dept: FAMILY MEDICINE | Facility: CLINIC | Age: 39
End: 2024-09-16
Payer: COMMERCIAL

## 2024-09-16 DIAGNOSIS — F90.0 ATTENTION DEFICIT HYPERACTIVITY DISORDER (ADHD), PREDOMINANTLY INATTENTIVE TYPE: ICD-10-CM

## 2024-09-16 PROCEDURE — 99213 OFFICE O/P EST LOW 20 MIN: CPT | Mod: 95 | Performed by: NURSE PRACTITIONER

## 2024-09-16 RX ORDER — DEXTROAMPHETAMINE SACCHARATE, AMPHETAMINE ASPARTATE MONOHYDRATE, DEXTROAMPHETAMINE SULFATE AND AMPHETAMINE SULFATE 5; 5; 5; 5 MG/1; MG/1; MG/1; MG/1
20 CAPSULE, EXTENDED RELEASE ORAL DAILY
Qty: 30 CAPSULE | Refills: 0 | Status: SHIPPED | OUTPATIENT
Start: 2024-12-10 | End: 2025-01-09

## 2024-09-16 RX ORDER — DEXTROAMPHETAMINE SACCHARATE, AMPHETAMINE ASPARTATE, DEXTROAMPHETAMINE SULFATE AND AMPHETAMINE SULFATE 2.5; 2.5; 2.5; 2.5 MG/1; MG/1; MG/1; MG/1
10 TABLET ORAL DAILY
Qty: 30 TABLET | Refills: 0 | Status: SHIPPED | OUTPATIENT
Start: 2024-12-10 | End: 2025-01-09

## 2024-09-16 RX ORDER — DEXTROAMPHETAMINE SACCHARATE, AMPHETAMINE ASPARTATE MONOHYDRATE, DEXTROAMPHETAMINE SULFATE AND AMPHETAMINE SULFATE 5; 5; 5; 5 MG/1; MG/1; MG/1; MG/1
20 CAPSULE, EXTENDED RELEASE ORAL DAILY
Qty: 30 CAPSULE | Refills: 0 | Status: SHIPPED | OUTPATIENT
Start: 2024-11-10 | End: 2024-12-10

## 2024-09-16 RX ORDER — DEXTROAMPHETAMINE SACCHARATE, AMPHETAMINE ASPARTATE MONOHYDRATE, DEXTROAMPHETAMINE SULFATE AND AMPHETAMINE SULFATE 5; 5; 5; 5 MG/1; MG/1; MG/1; MG/1
20 CAPSULE, EXTENDED RELEASE ORAL DAILY
Qty: 30 CAPSULE | Refills: 0 | Status: CANCELLED | OUTPATIENT
Start: 2024-09-16

## 2024-09-16 RX ORDER — DEXTROAMPHETAMINE SACCHARATE, AMPHETAMINE ASPARTATE, DEXTROAMPHETAMINE SULFATE AND AMPHETAMINE SULFATE 2.5; 2.5; 2.5; 2.5 MG/1; MG/1; MG/1; MG/1
10 TABLET ORAL DAILY
Qty: 30 TABLET | Refills: 0 | Status: CANCELLED | OUTPATIENT
Start: 2024-09-16

## 2024-09-16 RX ORDER — DEXTROAMPHETAMINE SACCHARATE, AMPHETAMINE ASPARTATE, DEXTROAMPHETAMINE SULFATE AND AMPHETAMINE SULFATE 2.5; 2.5; 2.5; 2.5 MG/1; MG/1; MG/1; MG/1
10 TABLET ORAL DAILY
Qty: 30 TABLET | Refills: 0 | Status: SHIPPED | OUTPATIENT
Start: 2024-11-10 | End: 2024-12-10

## 2024-09-16 RX ORDER — GABAPENTIN 300 MG/1
300 CAPSULE ORAL AT BEDTIME
Qty: 90 CAPSULE | Refills: 3 | Status: SHIPPED | OUTPATIENT
Start: 2024-09-16

## 2024-09-16 RX ORDER — DEXTROAMPHETAMINE SACCHARATE, AMPHETAMINE ASPARTATE MONOHYDRATE, DEXTROAMPHETAMINE SULFATE AND AMPHETAMINE SULFATE 5; 5; 5; 5 MG/1; MG/1; MG/1; MG/1
20 CAPSULE, EXTENDED RELEASE ORAL DAILY
Qty: 30 CAPSULE | Refills: 0 | Status: SHIPPED | OUTPATIENT
Start: 2024-10-10 | End: 2024-11-09

## 2024-09-16 RX ORDER — DEXTROAMPHETAMINE SACCHARATE, AMPHETAMINE ASPARTATE, DEXTROAMPHETAMINE SULFATE AND AMPHETAMINE SULFATE 2.5; 2.5; 2.5; 2.5 MG/1; MG/1; MG/1; MG/1
10 TABLET ORAL DAILY
Qty: 30 TABLET | Refills: 0 | Status: SHIPPED | OUTPATIENT
Start: 2024-10-10 | End: 2024-11-09

## 2024-09-16 ASSESSMENT — PATIENT HEALTH QUESTIONNAIRE - PHQ9: SUM OF ALL RESPONSES TO PHQ QUESTIONS 1-9: 0

## 2024-09-16 NOTE — PROGRESS NOTES
Amisha is a 39 year old who is being evaluated via a billable video visit.    How would you like to obtain your AVS? MyChart  If the video visit is dropped, the invitation should be resent by: Text to cell phone: [phone]  Will anyone else be joining your video visit? No      Assessment & Plan     Attention deficit hyperactivity disorder (ADHD), predominantly inattentive type   Controlled no change in treatment plan   - amphetamine-dextroamphetamine (ADDERALL XR) 20 MG 24 hr capsule; Take 1 capsule (20 mg) by mouth daily.  - amphetamine-dextroamphetamine (ADDERALL XR) 20 MG 24 hr capsule; Take 1 capsule (20 mg) by mouth daily.  - amphetamine-dextroamphetamine (ADDERALL XR) 20 MG 24 hr capsule; Take 1 capsule (20 mg) by mouth daily.  - amphetamine-dextroamphetamine (ADDERALL) 10 MG tablet; Take 1 tablet (10 mg) by mouth daily.  - amphetamine-dextroamphetamine (ADDERALL) 10 MG tablet; Take 1 tablet (10 mg) by mouth daily.  - amphetamine-dextroamphetamine (ADDERALL) 10 MG tablet; Take 1 tablet (10 mg) by mouth daily.  - gabapentin (NEURONTIN) 300 MG capsule; Take 1 capsule (300 mg) by mouth at bedtime.            See Patient Instructions    Subjective   Amisha is a 39 year old, presenting for the following health issues:  A.D.H.D        9/16/2024     2:30 PM   Additional Questions   Roomed by [facility]         History of Present Illness       Reason for visit:  Medication renewal   She is taking medications regularly.       Medication Followup of ADHD  Taking Medication as prescribed: yes  Side Effects:  None  Medication Helping Symptoms:  yes        Review of Systems  Constitutional, HEENT, cardiovascular, pulmonary, gi and gu systems are negative, except as otherwise noted.      Objective           Vitals:  No vitals were obtained today due to virtual visit.    Physical Exam   GENERAL: alert and no distress  EYES: Eyes grossly normal to inspection.  No discharge or erythema, or obvious scleral/conjunctival  abnormalities.  RESP: No audible wheeze, cough, or visible cyanosis.    SKIN: Visible skin clear. No significant rash, abnormal pigmentation or lesions.  NEURO: Cranial nerves grossly intact.  Mentation and speech appropriate for age.  PSYCH: Appropriate affect, tone, and pace of words          Video-Visit Details    Type of service:  Video Visit     Originating Location (pt. Location): Home    Distant Location (provider location):  On-site  Platform used for Video Visit: Thank you so much better still having his do signatures all yet this.  I put the patient on cardiac border on your desk 21 2024 there thank you there is your plan  Signed Electronically by: ALEENA Bueno CNP

## 2024-10-02 ENCOUNTER — MYC MEDICAL ADVICE (OUTPATIENT)
Dept: FAMILY MEDICINE | Facility: CLINIC | Age: 39
End: 2024-10-02
Payer: COMMERCIAL

## 2024-10-02 NOTE — TELEPHONE ENCOUNTER
Called and spoke with pt. Pt only has 2 vaccines on file, so called and clarified with her. Stated she was seen at [facility] previously so she is going to contact them to see what records they have. Pt also still requesting print out of her [place] immunizations. Printed and placed at  for pt to .    Tabitha Mims Patient

## 2025-01-06 ENCOUNTER — VIRTUAL VISIT (OUTPATIENT)
Dept: FAMILY MEDICINE | Facility: CLINIC | Age: 40
End: 2025-01-06
Attending: NURSE PRACTITIONER
Payer: COMMERCIAL

## 2025-01-06 DIAGNOSIS — F90.0 ATTENTION DEFICIT HYPERACTIVITY DISORDER (ADHD), PREDOMINANTLY INATTENTIVE TYPE: ICD-10-CM

## 2025-01-06 DIAGNOSIS — E66.01 MORBID OBESITY (H): Primary | ICD-10-CM

## 2025-01-06 PROCEDURE — 98006 SYNCH AUDIO-VIDEO EST MOD 30: CPT | Performed by: NURSE PRACTITIONER

## 2025-01-06 RX ORDER — DEXTROAMPHETAMINE SACCHARATE, AMPHETAMINE ASPARTATE, DEXTROAMPHETAMINE SULFATE AND AMPHETAMINE SULFATE 2.5; 2.5; 2.5; 2.5 MG/1; MG/1; MG/1; MG/1
10 TABLET ORAL DAILY
Qty: 30 TABLET | Refills: 0 | Status: SHIPPED | OUTPATIENT
Start: 2025-03-13 | End: 2025-04-12

## 2025-01-06 RX ORDER — DEXTROAMPHETAMINE SACCHARATE, AMPHETAMINE ASPARTATE, DEXTROAMPHETAMINE SULFATE AND AMPHETAMINE SULFATE 2.5; 2.5; 2.5; 2.5 MG/1; MG/1; MG/1; MG/1
10 TABLET ORAL DAILY
Qty: 30 TABLET | Refills: 0 | Status: SHIPPED | OUTPATIENT
Start: 2025-01-13 | End: 2025-02-12

## 2025-01-06 RX ORDER — DEXTROAMPHETAMINE SACCHARATE, AMPHETAMINE ASPARTATE, DEXTROAMPHETAMINE SULFATE AND AMPHETAMINE SULFATE 2.5; 2.5; 2.5; 2.5 MG/1; MG/1; MG/1; MG/1
10 TABLET ORAL DAILY
Qty: 30 TABLET | Refills: 0 | Status: CANCELLED | OUTPATIENT
Start: 2025-01-06

## 2025-01-06 RX ORDER — DEXTROAMPHETAMINE SACCHARATE, AMPHETAMINE ASPARTATE MONOHYDRATE, DEXTROAMPHETAMINE SULFATE AND AMPHETAMINE SULFATE 5; 5; 5; 5 MG/1; MG/1; MG/1; MG/1
20 CAPSULE, EXTENDED RELEASE ORAL DAILY
Qty: 30 CAPSULE | Refills: 0 | Status: SHIPPED | OUTPATIENT
Start: 2025-04-13 | End: 2025-05-13

## 2025-01-06 RX ORDER — DEXTROAMPHETAMINE SACCHARATE, AMPHETAMINE ASPARTATE, DEXTROAMPHETAMINE SULFATE AND AMPHETAMINE SULFATE 2.5; 2.5; 2.5; 2.5 MG/1; MG/1; MG/1; MG/1
10 TABLET ORAL DAILY
Qty: 30 TABLET | Refills: 0 | Status: SHIPPED | OUTPATIENT
Start: 2025-02-13 | End: 2025-03-15

## 2025-01-06 RX ORDER — DEXTROAMPHETAMINE SACCHARATE, AMPHETAMINE ASPARTATE MONOHYDRATE, DEXTROAMPHETAMINE SULFATE AND AMPHETAMINE SULFATE 5; 5; 5; 5 MG/1; MG/1; MG/1; MG/1
20 CAPSULE, EXTENDED RELEASE ORAL DAILY
Qty: 30 CAPSULE | Refills: 0 | Status: CANCELLED | OUTPATIENT
Start: 2025-01-06

## 2025-01-06 RX ORDER — DEXTROAMPHETAMINE SACCHARATE, AMPHETAMINE ASPARTATE MONOHYDRATE, DEXTROAMPHETAMINE SULFATE AND AMPHETAMINE SULFATE 5; 5; 5; 5 MG/1; MG/1; MG/1; MG/1
20 CAPSULE, EXTENDED RELEASE ORAL DAILY
Qty: 30 CAPSULE | Refills: 0 | Status: SHIPPED | OUTPATIENT
Start: 2025-02-13 | End: 2025-03-15

## 2025-01-06 RX ORDER — DEXTROAMPHETAMINE SACCHARATE, AMPHETAMINE ASPARTATE MONOHYDRATE, DEXTROAMPHETAMINE SULFATE AND AMPHETAMINE SULFATE 5; 5; 5; 5 MG/1; MG/1; MG/1; MG/1
20 CAPSULE, EXTENDED RELEASE ORAL DAILY
Qty: 30 CAPSULE | Refills: 0 | Status: SHIPPED | OUTPATIENT
Start: 2025-01-13 | End: 2025-02-12

## 2025-01-06 NOTE — PROGRESS NOTES
"Amisha is a 39 year old who is being evaluated via a billable video visit.    How would you like to obtain your AVS? MyChart  If the video visit is dropped, the invitation should be resent by: Text to cell phone: [phone]  Will anyone else be joining your video visit? No      Assessment & Plan     Attention deficit hyperactivity disorder (ADHD), predominantly inattentive type   Controlled no change in treatment plan    - amphetamine-dextroamphetamine (ADDERALL XR) 20 MG 24 hr capsule; Take 1 capsule (20 mg) by mouth daily.  - amphetamine-dextroamphetamine (ADDERALL XR) 20 MG 24 hr capsule; Take 1 capsule (20 mg) by mouth daily.  - amphetamine-dextroamphetamine (ADDERALL XR) 20 MG 24 hr capsule; Take 1 capsule (20 mg) by mouth daily.  - amphetamine-dextroamphetamine (ADDERALL) 10 MG tablet; Take 1 tablet (10 mg) by mouth daily.  - amphetamine-dextroamphetamine (ADDERALL) 10 MG tablet; Take 1 tablet (10 mg) by mouth daily.  - amphetamine-dextroamphetamine (ADDERALL) 10 MG tablet; Take 1 tablet (10 mg) by mouth daily.    Morbid obesity (H)  Patient would like to look at weight loss options recommend patient follow-up with weight loss clinic.  - TSH with free T4 reflex; Future  - Adult Comprehensive Weight Management  Referral; Future  - Hemoglobin A1c; Future          BMI  Estimated body mass index is 34.67 kg/m  as calculated from the following:    Height as of 7/9/24: 1.626 m (5' 4\").    Weight as of 7/9/24: 91.6 kg (202 lb).   Weight management plan: Discussed healthy diet and exercise guidelines      See Patient Instructions    Subjective   Amisha is a 39 year old, presenting for the following health issues:  A.D.H.D        1/6/2025     4:10 PM   Additional Questions   Roomed by Haley GORMAN CMA     History of Present Illness       Reason for visit:  Medication refill   She is taking medications regularly.       Medication Followup of Adderall 20 mg and 10 mg   Taking Medication as prescribed: yes  Side " Effects:  None  Medication Helping Symptoms:  yes          Review of Systems  Constitutional, HEENT, cardiovascular, pulmonary, gi and gu systems are negative, except as otherwise noted.      Objective           Vitals:  No vitals were obtained today due to virtual visit.    Physical Exam   GENERAL: alert and no distress  EYES: Eyes grossly normal to inspection.  No discharge or erythema, or obvious scleral/conjunctival abnormalities.  RESP: No audible wheeze, cough, or visible cyanosis.    SKIN: Visible skin clear. No significant rash, abnormal pigmentation or lesions.  NEURO: Cranial nerves grossly intact.  Mentation and speech appropriate for age.  PSYCH: Appropriate affect, tone, and pace of words    No results found for any visits on 01/06/25.      Video-Visit Details    Type of service:  Video Visit   Originating Location (pt. Location): Home    Distant Location (provider location):  On-site  Platform used for Video Visit: Hardy  Signed Electronically by: ALEENA Bueno CNP

## 2025-03-07 DIAGNOSIS — F90.0 ATTENTION DEFICIT HYPERACTIVITY DISORDER (ADHD), PREDOMINANTLY INATTENTIVE TYPE: ICD-10-CM

## 2025-03-10 ENCOUNTER — PATIENT OUTREACH (OUTPATIENT)
Dept: CARE COORDINATION | Facility: CLINIC | Age: 40
End: 2025-03-10
Payer: COMMERCIAL

## 2025-03-10 RX ORDER — DEXTROAMPHETAMINE SACCHARATE, AMPHETAMINE ASPARTATE MONOHYDRATE, DEXTROAMPHETAMINE SULFATE AND AMPHETAMINE SULFATE 5; 5; 5; 5 MG/1; MG/1; MG/1; MG/1
20 CAPSULE, EXTENDED RELEASE ORAL DAILY
Qty: 30 CAPSULE | Refills: 0 | Status: SHIPPED | OUTPATIENT
Start: 2025-03-10 | End: 2025-04-09

## 2025-04-09 DIAGNOSIS — F90.0 ATTENTION DEFICIT HYPERACTIVITY DISORDER (ADHD), PREDOMINANTLY INATTENTIVE TYPE: ICD-10-CM

## 2025-04-09 RX ORDER — DEXTROAMPHETAMINE SACCHARATE, AMPHETAMINE ASPARTATE, DEXTROAMPHETAMINE SULFATE AND AMPHETAMINE SULFATE 2.5; 2.5; 2.5; 2.5 MG/1; MG/1; MG/1; MG/1
10 TABLET ORAL DAILY
Qty: 30 TABLET | Refills: 0 | Status: SHIPPED | OUTPATIENT
Start: 2025-04-09

## 2025-05-05 DIAGNOSIS — F90.0 ATTENTION DEFICIT HYPERACTIVITY DISORDER (ADHD), PREDOMINANTLY INATTENTIVE TYPE: ICD-10-CM

## 2025-05-05 RX ORDER — DEXTROAMPHETAMINE SACCHARATE, AMPHETAMINE ASPARTATE, DEXTROAMPHETAMINE SULFATE AND AMPHETAMINE SULFATE 2.5; 2.5; 2.5; 2.5 MG/1; MG/1; MG/1; MG/1
10 TABLET ORAL DAILY
Qty: 30 TABLET | Refills: 0 | Status: SHIPPED | OUTPATIENT
Start: 2025-05-05

## 2025-05-05 RX ORDER — DEXTROAMPHETAMINE SACCHARATE, AMPHETAMINE ASPARTATE MONOHYDRATE, DEXTROAMPHETAMINE SULFATE AND AMPHETAMINE SULFATE 5; 5; 5; 5 MG/1; MG/1; MG/1; MG/1
20 CAPSULE, EXTENDED RELEASE ORAL DAILY
Qty: 30 CAPSULE | Refills: 0 | Status: SHIPPED | OUTPATIENT
Start: 2025-05-05 | End: 2025-06-04

## 2025-06-02 DIAGNOSIS — F90.0 ATTENTION DEFICIT HYPERACTIVITY DISORDER (ADHD), PREDOMINANTLY INATTENTIVE TYPE: ICD-10-CM

## 2025-06-02 RX ORDER — DEXTROAMPHETAMINE SACCHARATE, AMPHETAMINE ASPARTATE MONOHYDRATE, DEXTROAMPHETAMINE SULFATE AND AMPHETAMINE SULFATE 5; 5; 5; 5 MG/1; MG/1; MG/1; MG/1
20 CAPSULE, EXTENDED RELEASE ORAL DAILY
Qty: 30 CAPSULE | Refills: 0 | Status: SHIPPED | OUTPATIENT
Start: 2025-06-02 | End: 2025-07-02

## 2025-06-02 RX ORDER — DEXTROAMPHETAMINE SACCHARATE, AMPHETAMINE ASPARTATE, DEXTROAMPHETAMINE SULFATE AND AMPHETAMINE SULFATE 2.5; 2.5; 2.5; 2.5 MG/1; MG/1; MG/1; MG/1
10 TABLET ORAL DAILY
Qty: 30 TABLET | Refills: 0 | Status: SHIPPED | OUTPATIENT
Start: 2025-06-02

## 2025-06-09 ENCOUNTER — PATIENT OUTREACH (OUTPATIENT)
Dept: CARE COORDINATION | Facility: CLINIC | Age: 40
End: 2025-06-09
Payer: COMMERCIAL

## 2025-06-23 ENCOUNTER — PATIENT OUTREACH (OUTPATIENT)
Dept: CARE COORDINATION | Facility: CLINIC | Age: 40
End: 2025-06-23
Payer: COMMERCIAL

## 2025-06-25 DIAGNOSIS — F90.0 ATTENTION DEFICIT HYPERACTIVITY DISORDER (ADHD), PREDOMINANTLY INATTENTIVE TYPE: ICD-10-CM

## 2025-06-25 RX ORDER — DEXTROAMPHETAMINE SACCHARATE, AMPHETAMINE ASPARTATE, DEXTROAMPHETAMINE SULFATE AND AMPHETAMINE SULFATE 2.5; 2.5; 2.5; 2.5 MG/1; MG/1; MG/1; MG/1
10 TABLET ORAL DAILY
Qty: 30 TABLET | Refills: 0 | OUTPATIENT
Start: 2025-06-25

## 2025-06-25 RX ORDER — DEXTROAMPHETAMINE SACCHARATE, AMPHETAMINE ASPARTATE MONOHYDRATE, DEXTROAMPHETAMINE SULFATE AND AMPHETAMINE SULFATE 5; 5; 5; 5 MG/1; MG/1; MG/1; MG/1
20 CAPSULE, EXTENDED RELEASE ORAL DAILY
Qty: 30 CAPSULE | Refills: 0 | OUTPATIENT
Start: 2025-06-25 | End: 2025-07-25

## 2025-06-27 RX ORDER — DEXTROAMPHETAMINE SACCHARATE, AMPHETAMINE ASPARTATE MONOHYDRATE, DEXTROAMPHETAMINE SULFATE AND AMPHETAMINE SULFATE 5; 5; 5; 5 MG/1; MG/1; MG/1; MG/1
20 CAPSULE, EXTENDED RELEASE ORAL DAILY
Qty: 30 CAPSULE | Refills: 0 | OUTPATIENT
Start: 2025-06-27

## 2025-06-27 RX ORDER — DEXTROAMPHETAMINE SACCHARATE, AMPHETAMINE ASPARTATE, DEXTROAMPHETAMINE SULFATE AND AMPHETAMINE SULFATE 2.5; 2.5; 2.5; 2.5 MG/1; MG/1; MG/1; MG/1
10 TABLET ORAL DAILY
Qty: 30 TABLET | Refills: 0 | OUTPATIENT
Start: 2025-06-27

## 2025-07-01 NOTE — TELEPHONE ENCOUNTER
Patient scheduled appt for 7/17/25 Patient is requesting a bridge refill on medication    Yeny Peoples/ Patient

## 2025-07-03 RX ORDER — DEXTROAMPHETAMINE SACCHARATE, AMPHETAMINE ASPARTATE MONOHYDRATE, DEXTROAMPHETAMINE SULFATE AND AMPHETAMINE SULFATE 5; 5; 5; 5 MG/1; MG/1; MG/1; MG/1
20 CAPSULE, EXTENDED RELEASE ORAL DAILY
Qty: 15 CAPSULE | Refills: 0 | Status: SHIPPED | OUTPATIENT
Start: 2025-07-03

## 2025-07-03 RX ORDER — DEXTROAMPHETAMINE SACCHARATE, AMPHETAMINE ASPARTATE MONOHYDRATE, DEXTROAMPHETAMINE SULFATE AND AMPHETAMINE SULFATE 2.5; 2.5; 2.5; 2.5 MG/1; MG/1; MG/1; MG/1
10 CAPSULE, EXTENDED RELEASE ORAL DAILY
Qty: 15 CAPSULE | Refills: 0 | Status: SHIPPED | OUTPATIENT
Start: 2025-07-03

## 2025-07-14 SDOH — HEALTH STABILITY: PHYSICAL HEALTH: ON AVERAGE, HOW MANY MINUTES DO YOU ENGAGE IN EXERCISE AT THIS LEVEL?: 40 MIN

## 2025-07-14 SDOH — HEALTH STABILITY: PHYSICAL HEALTH: ON AVERAGE, HOW MANY DAYS PER WEEK DO YOU ENGAGE IN MODERATE TO STRENUOUS EXERCISE (LIKE A BRISK WALK)?: 5 DAYS

## 2025-07-14 ASSESSMENT — SOCIAL DETERMINANTS OF HEALTH (SDOH): HOW OFTEN DO YOU GET TOGETHER WITH FRIENDS OR RELATIVES?: ONCE A WEEK

## 2025-07-17 ENCOUNTER — OFFICE VISIT (OUTPATIENT)
Dept: FAMILY MEDICINE | Facility: CLINIC | Age: 40
End: 2025-07-17
Payer: COMMERCIAL

## 2025-07-17 VITALS
BODY MASS INDEX: 31.76 KG/M2 | TEMPERATURE: 97.1 F | HEIGHT: 64 IN | DIASTOLIC BLOOD PRESSURE: 70 MMHG | RESPIRATION RATE: 20 BRPM | WEIGHT: 186 LBS | OXYGEN SATURATION: 99 % | HEART RATE: 92 BPM | SYSTOLIC BLOOD PRESSURE: 118 MMHG

## 2025-07-17 DIAGNOSIS — G47.00 INSOMNIA, UNSPECIFIED TYPE: ICD-10-CM

## 2025-07-17 DIAGNOSIS — F90.0 ATTENTION DEFICIT HYPERACTIVITY DISORDER (ADHD), PREDOMINANTLY INATTENTIVE TYPE: ICD-10-CM

## 2025-07-17 DIAGNOSIS — Z00.00 ROUTINE GENERAL MEDICAL EXAMINATION AT A HEALTH CARE FACILITY: Primary | ICD-10-CM

## 2025-07-17 DIAGNOSIS — Z12.4 CERVICAL CANCER SCREENING: ICD-10-CM

## 2025-07-17 DIAGNOSIS — F33.1 MAJOR DEPRESSIVE DISORDER, RECURRENT EPISODE, MODERATE (H): ICD-10-CM

## 2025-07-17 DIAGNOSIS — E66.01 MORBID OBESITY (H): ICD-10-CM

## 2025-07-17 DIAGNOSIS — Z12.31 VISIT FOR SCREENING MAMMOGRAM: ICD-10-CM

## 2025-07-17 DIAGNOSIS — S13.9XXA SPRAIN OF NECK, INITIAL ENCOUNTER: ICD-10-CM

## 2025-07-17 LAB
ALBUMIN SERPL BCG-MCNC: 4.4 G/DL (ref 3.5–5.2)
ALP SERPL-CCNC: 76 U/L (ref 40–150)
ALT SERPL W P-5'-P-CCNC: 17 U/L (ref 0–50)
ANION GAP SERPL CALCULATED.3IONS-SCNC: 12 MMOL/L (ref 7–15)
AST SERPL W P-5'-P-CCNC: 18 U/L (ref 0–45)
BILIRUB SERPL-MCNC: 0.5 MG/DL
BUN SERPL-MCNC: 8.7 MG/DL (ref 6–20)
CALCIUM SERPL-MCNC: 9.2 MG/DL (ref 8.8–10.4)
CHLORIDE SERPL-SCNC: 103 MMOL/L (ref 98–107)
CHOLEST SERPL-MCNC: 192 MG/DL
CREAT SERPL-MCNC: 0.62 MG/DL (ref 0.51–0.95)
EGFRCR SERPLBLD CKD-EPI 2021: >90 ML/MIN/1.73M2
ERYTHROCYTE [DISTWIDTH] IN BLOOD BY AUTOMATED COUNT: 11.9 % (ref 10–15)
EST. AVERAGE GLUCOSE BLD GHB EST-MCNC: 103 MG/DL
FASTING STATUS PATIENT QL REPORTED: NO
FASTING STATUS PATIENT QL REPORTED: NO
GLUCOSE SERPL-MCNC: 94 MG/DL (ref 70–99)
HBA1C MFR BLD: 5.2 % (ref 0–5.6)
HCO3 SERPL-SCNC: 23 MMOL/L (ref 22–29)
HCT VFR BLD AUTO: 43.4 % (ref 35–47)
HDLC SERPL-MCNC: 46 MG/DL
HGB BLD-MCNC: 14.2 G/DL (ref 11.7–15.7)
LDLC SERPL CALC-MCNC: 134 MG/DL
MCH RBC QN AUTO: 31.2 PG (ref 26.5–33)
MCHC RBC AUTO-ENTMCNC: 32.7 G/DL (ref 31.5–36.5)
MCV RBC AUTO: 95 FL (ref 78–100)
NONHDLC SERPL-MCNC: 146 MG/DL
PLATELET # BLD AUTO: 287 10E3/UL (ref 150–450)
POTASSIUM SERPL-SCNC: 4 MMOL/L (ref 3.4–5.3)
PROT SERPL-MCNC: 7.4 G/DL (ref 6.4–8.3)
RBC # BLD AUTO: 4.55 10E6/UL (ref 3.8–5.2)
SODIUM SERPL-SCNC: 138 MMOL/L (ref 135–145)
TRIGL SERPL-MCNC: 62 MG/DL
TSH SERPL DL<=0.005 MIU/L-ACNC: 2.28 UIU/ML (ref 0.3–4.2)
WBC # BLD AUTO: 11 10E3/UL (ref 4–11)

## 2025-07-17 PROCEDURE — 3050F LDL-C >= 130 MG/DL: CPT | Performed by: NURSE PRACTITIONER

## 2025-07-17 PROCEDURE — 3078F DIAST BP <80 MM HG: CPT | Performed by: NURSE PRACTITIONER

## 2025-07-17 PROCEDURE — 85027 COMPLETE CBC AUTOMATED: CPT | Performed by: NURSE PRACTITIONER

## 2025-07-17 PROCEDURE — 99214 OFFICE O/P EST MOD 30 MIN: CPT | Mod: 25 | Performed by: NURSE PRACTITIONER

## 2025-07-17 PROCEDURE — 99396 PREV VISIT EST AGE 40-64: CPT | Performed by: NURSE PRACTITIONER

## 2025-07-17 PROCEDURE — 80061 LIPID PANEL: CPT | Performed by: NURSE PRACTITIONER

## 2025-07-17 PROCEDURE — 3074F SYST BP LT 130 MM HG: CPT | Performed by: NURSE PRACTITIONER

## 2025-07-17 PROCEDURE — 83036 HEMOGLOBIN GLYCOSYLATED A1C: CPT | Performed by: NURSE PRACTITIONER

## 2025-07-17 PROCEDURE — 84443 ASSAY THYROID STIM HORMONE: CPT | Performed by: NURSE PRACTITIONER

## 2025-07-17 PROCEDURE — 36415 COLL VENOUS BLD VENIPUNCTURE: CPT | Performed by: NURSE PRACTITIONER

## 2025-07-17 PROCEDURE — G0145 SCR C/V CYTO,THINLAYER,RESCR: HCPCS | Performed by: NURSE PRACTITIONER

## 2025-07-17 PROCEDURE — 87624 HPV HI-RISK TYP POOLED RSLT: CPT | Performed by: NURSE PRACTITIONER

## 2025-07-17 PROCEDURE — 80053 COMPREHEN METABOLIC PANEL: CPT | Performed by: NURSE PRACTITIONER

## 2025-07-17 PROCEDURE — 3044F HG A1C LEVEL LT 7.0%: CPT | Performed by: NURSE PRACTITIONER

## 2025-07-17 PROCEDURE — 1126F AMNT PAIN NOTED NONE PRSNT: CPT | Performed by: NURSE PRACTITIONER

## 2025-07-17 RX ORDER — DEXTROAMPHETAMINE SACCHARATE, AMPHETAMINE ASPARTATE, DEXTROAMPHETAMINE SULFATE AND AMPHETAMINE SULFATE 2.5; 2.5; 2.5; 2.5 MG/1; MG/1; MG/1; MG/1
10 TABLET ORAL DAILY
Qty: 30 TABLET | Refills: 0 | Status: SHIPPED | OUTPATIENT
Start: 2025-09-02 | End: 2025-10-02

## 2025-07-17 RX ORDER — TRAZODONE HYDROCHLORIDE 150 MG/1
TABLET ORAL
Qty: 135 TABLET | Refills: 3 | Status: SHIPPED | OUTPATIENT
Start: 2025-07-17

## 2025-07-17 RX ORDER — DEXTROAMPHETAMINE SACCHARATE, AMPHETAMINE ASPARTATE, DEXTROAMPHETAMINE SULFATE AND AMPHETAMINE SULFATE 2.5; 2.5; 2.5; 2.5 MG/1; MG/1; MG/1; MG/1
10 TABLET ORAL DAILY
Qty: 30 TABLET | Refills: 0 | Status: SHIPPED | OUTPATIENT
Start: 2025-10-02 | End: 2025-11-01

## 2025-07-17 RX ORDER — DEXTROAMPHETAMINE SACCHARATE, AMPHETAMINE ASPARTATE, DEXTROAMPHETAMINE SULFATE AND AMPHETAMINE SULFATE 5; 5; 5; 5 MG/1; MG/1; MG/1; MG/1
20 TABLET ORAL DAILY
Qty: 30 TABLET | Refills: 0 | Status: SHIPPED | OUTPATIENT
Start: 2025-10-02 | End: 2025-11-01

## 2025-07-17 RX ORDER — DEXTROAMPHETAMINE SACCHARATE, AMPHETAMINE ASPARTATE, DEXTROAMPHETAMINE SULFATE AND AMPHETAMINE SULFATE 5; 5; 5; 5 MG/1; MG/1; MG/1; MG/1
20 TABLET ORAL DAILY
Qty: 30 TABLET | Refills: 0 | Status: SHIPPED | OUTPATIENT
Start: 2025-09-02 | End: 2025-10-02

## 2025-07-17 RX ORDER — DEXTROAMPHETAMINE SACCHARATE, AMPHETAMINE ASPARTATE MONOHYDRATE, DEXTROAMPHETAMINE SULFATE AND AMPHETAMINE SULFATE 2.5; 2.5; 2.5; 2.5 MG/1; MG/1; MG/1; MG/1
10 CAPSULE, EXTENDED RELEASE ORAL DAILY
Qty: 15 CAPSULE | Refills: 0 | Status: CANCELLED | OUTPATIENT
Start: 2025-07-17

## 2025-07-17 RX ORDER — DEXTROAMPHETAMINE SACCHARATE, AMPHETAMINE ASPARTATE, DEXTROAMPHETAMINE SULFATE AND AMPHETAMINE SULFATE 2.5; 2.5; 2.5; 2.5 MG/1; MG/1; MG/1; MG/1
10 TABLET ORAL DAILY
Qty: 30 TABLET | Refills: 0 | Status: CANCELLED | OUTPATIENT
Start: 2025-07-17

## 2025-07-17 RX ORDER — DEXTROAMPHETAMINE SACCHARATE, AMPHETAMINE ASPARTATE MONOHYDRATE, DEXTROAMPHETAMINE SULFATE AND AMPHETAMINE SULFATE 5; 5; 5; 5 MG/1; MG/1; MG/1; MG/1
20 CAPSULE, EXTENDED RELEASE ORAL DAILY
Qty: 30 CAPSULE | Refills: 0 | Status: CANCELLED | OUTPATIENT
Start: 2025-07-17

## 2025-07-17 RX ORDER — DEXTROAMPHETAMINE SACCHARATE, AMPHETAMINE ASPARTATE MONOHYDRATE, DEXTROAMPHETAMINE SULFATE AND AMPHETAMINE SULFATE 5; 5; 5; 5 MG/1; MG/1; MG/1; MG/1
20 CAPSULE, EXTENDED RELEASE ORAL DAILY
Qty: 15 CAPSULE | Refills: 0 | Status: CANCELLED | OUTPATIENT
Start: 2025-07-17

## 2025-07-17 RX ORDER — METHOCARBAMOL 500 MG/1
500 TABLET, FILM COATED ORAL 2 TIMES DAILY PRN
Qty: 60 TABLET | Refills: 4 | Status: SHIPPED | OUTPATIENT
Start: 2025-07-17

## 2025-07-17 RX ORDER — DEXTROAMPHETAMINE SACCHARATE, AMPHETAMINE ASPARTATE, DEXTROAMPHETAMINE SULFATE AND AMPHETAMINE SULFATE 5; 5; 5; 5 MG/1; MG/1; MG/1; MG/1
20 TABLET ORAL DAILY
Qty: 30 TABLET | Refills: 0 | Status: SHIPPED | OUTPATIENT
Start: 2025-08-02 | End: 2025-09-01

## 2025-07-17 RX ORDER — GABAPENTIN 300 MG/1
300 CAPSULE ORAL AT BEDTIME
Qty: 90 CAPSULE | Refills: 3 | Status: SHIPPED | OUTPATIENT
Start: 2025-07-17

## 2025-07-17 RX ORDER — DEXTROAMPHETAMINE SACCHARATE, AMPHETAMINE ASPARTATE, DEXTROAMPHETAMINE SULFATE AND AMPHETAMINE SULFATE 2.5; 2.5; 2.5; 2.5 MG/1; MG/1; MG/1; MG/1
10 TABLET ORAL DAILY
Qty: 30 TABLET | Refills: 0 | Status: SHIPPED | OUTPATIENT
Start: 2025-08-02 | End: 2025-09-01

## 2025-07-17 ASSESSMENT — PATIENT HEALTH QUESTIONNAIRE - PHQ9
SUM OF ALL RESPONSES TO PHQ QUESTIONS 1-9: 0
SUM OF ALL RESPONSES TO PHQ QUESTIONS 1-9: 0

## 2025-07-17 ASSESSMENT — PAIN SCALES - GENERAL: PAINLEVEL_OUTOF10: NO PAIN (0)

## 2025-07-17 NOTE — PATIENT INSTRUCTIONS
Patient Education   Preventive Care Advice   This is general advice given by our system to help you stay healthy. However, your care team may have specific advice just for you. Please talk to your care team about your preventive care needs.  Nutrition  Eat 5 or more servings of fruits and vegetables each day.  Try wheat bread, brown rice and whole grain pasta (instead of white bread, rice, and pasta).  Get enough calcium and vitamin D. Check the label on foods and aim for 100% of the RDA (recommended daily allowance).  Lifestyle  Exercise at least 150 minutes each week  (30 minutes a day, 5 days a week).  Do muscle strengthening activities 2 days a week. These help control your weight and prevent disease.  No smoking.  Wear sunscreen to prevent skin cancer.  Have a dental exam and cleaning every 6 months.  Yearly exams  See your health care team every year to talk about:  Any changes in your health.  Any medicines your care team has prescribed.  Preventive care, family planning, and ways to prevent chronic diseases.  Shots (vaccines)   HPV shots (up to age 26), if you've never had them before.  Hepatitis B shots (up to age 59), if you've never had them before.  COVID-19 shot: Get this shot when it's due.  Flu shot: Get a flu shot every year.  Tetanus shot: Get a tetanus shot every 10 years.  Pneumococcal, hepatitis A, and RSV shots: Ask your care team if you need these based on your risk.  Shingles shot (for age 50 and up)  General health tests  Diabetes screening:  Starting at age 35, Get screened for diabetes at least every 3 years.  If you are younger than age 35, ask your care team if you should be screened for diabetes.  Cholesterol test: At age 39, start having a cholesterol test every 5 years, or more often if advised.  Bone density scan (DEXA): At age 50, ask your care team if you should have this scan for osteoporosis (brittle bones).  Hepatitis C: Get tested at least once in your life.  STIs (sexually  transmitted infections)  Before age 24: Ask your care team if you should be screened for STIs.  After age 24: Get screened for STIs if you're at risk. You are at risk for STIs (including HIV) if:  You are sexually active with more than one person.  You don't use condoms every time.  You or a partner was diagnosed with a sexually transmitted infection.  If you are at risk for HIV, ask about PrEP medicine to prevent HIV.  Get tested for HIV at least once in your life, whether you are at risk for HIV or not.  Cancer screening tests  Cervical cancer screening: If you have a cervix, begin getting regular cervical cancer screening tests starting at age 21.  Breast cancer scan (mammogram): If you've ever had breasts, begin having regular mammograms starting at age 40. This is a scan to check for breast cancer.  Colon cancer screening: It is important to start screening for colon cancer at age 45.  Have a colonoscopy test every 10 years (or more often if you're at risk) Or, ask your provider about stool tests like a FIT test every year or Cologuard test every 3 years.  To learn more about your testing options, visit:   .  For help making a decision, visit:   https://bit.ly/ph00872.  Prostate cancer screening test: If you have a prostate, ask your care team if a prostate cancer screening test (PSA) at age 55 is right for you.  Lung cancer screening: If you are a current or former smoker ages 50 to 80, ask your care team if ongoing lung cancer screenings are right for you.  For informational purposes only. Not to replace the advice of your health care provider. Copyright   2023 [employer]. All rights reserved. Clinically reviewed by the [facility]itions Program. [employer] [id number] - REV 01/24.

## 2025-07-17 NOTE — PROGRESS NOTES
Preventive Care Visit  [facility]  ALEENA Bueno CNP, Family Medicine  Jul 17, 2025      Assessment & Plan     Routine general medical examination at a health care facility  Labs pending   - Lipid panel reflex to direct LDL Fasting; Future  - TSH with free T4 reflex; Future  - Comprehensive metabolic panel (BMP + Alb, Alk Phos, ALT, AST, Total. Bili, TP); Future  - CBC with platelets; Future  - Lipid panel reflex to direct LDL Fasting  - Comprehensive metabolic panel (BMP + Alb, Alk Phos, ALT, AST, Total. Bili, TP)  - CBC with platelets    Attention deficit hyperactivity disorder (ADHD), predominantly inattentive type  Stable   - gabapentin (NEURONTIN) 300 MG capsule; Take 1 capsule (300 mg) by mouth at bedtime.  - amphetamine-dextroamphetamine (ADDERALL) 20 MG tablet; Take 1 tablet (20 mg) by mouth daily.  - amphetamine-dextroamphetamine (ADDERALL) 20 MG tablet; Take 1 tablet (20 mg) by mouth daily.  - amphetamine-dextroamphetamine (ADDERALL) 20 MG tablet; Take 1 tablet (20 mg) by mouth daily.  - amphetamine-dextroamphetamine (ADDERALL) 10 MG tablet; Take 1 tablet (10 mg) by mouth daily.  - amphetamine-dextroamphetamine (ADDERALL) 10 MG tablet; Take 1 tablet (10 mg) by mouth daily.  - amphetamine-dextroamphetamine (ADDERALL) 10 MG tablet; Take 1 tablet (10 mg) by mouth daily.  - OFFICE/OUTPT VISIT,EST,LEVL IV    Major depressive disorder, recurrent episode, moderate (H)  Stable   - OFFICE/OUTPT VISIT,EST,LEVL IV    Insomnia, unspecified type  Stable   - traZODone (DESYREL) 150 MG tablet; Take 1 and 1/2 tablets (total 225 mg) at bedtime PRN  - OFFICE/OUTPT VISIT,EST,LEVL IV    Morbid obesity (H)  Reviewed diet and exercise  - TSH with free T4 reflex  - Hemoglobin A1c  - OFFICE/OUTPT VISIT,EST,LEVL IV    Sprain of neck, initial encounter  Stable   - methocarbamol (ROBAXIN) 500 MG tablet; Take 1 tablet (500 mg) by mouth 2 times daily as needed for muscle spasms.  - OFFICE/OUTPT  "VISIT,ESTLEVL IV    Cervical cancer screening  - HPV and Gynecologic Cytology Panel - Recommended Age 30 - 65 Years  - Gynecologic Cytology (PAP)    Visit for screening mammogram  - MA Screening Bilateral w/ Shaggy; Future  Patient has been advised of split billing requirements and indicates understanding: Yes    BMI  Estimated body mass index is 31.93 kg/m  as calculated from the following:    Height as of this encounter: 1.626 m (5' 4\").    Weight as of this encounter: 84.4 kg (186 lb).   Weight management plan: Discussed healthy diet and exercise guidelines    Counseling  Appropriate preventive services were addressed with this patient via screening, questionnaire, or discussion as appropriate for fall prevention, nutrition, physical activity, Tobacco-use cessation, social engagement, weight loss and cognition.  Checklist reviewing preventive services available has been given to the patient.  Reviewed patient's diet, addressing concerns and/or questions.   Reviewed preventive health counseling, as reflected in patient instructions       Regular exercise       Healthy diet/nutrition       Vision screening       Alcohol use       Contraception       Osteoporosis prevention/bone health    Follow-up    Follow-up Visit   Expected date:  Jul 17, 2026 (Approximate)      Follow Up Appointment Details:     Follow-up with whom?: PCP    Follow-Up for what?: Adult Preventive    How?: In Person                 Tita Lion is a 40 year old, presenting for the following:  Physical        7/17/2025    12:46 PM   Additional Questions   Roomed by Patti ACE       Advance Care Planning    Discussed advance care planning with patient; informed AVS has link to [facility].        7/14/2025   General Health   How would you rate your overall physical health? Good   Feel stress (tense, anxious, or unable to sleep) Not at all         7/14/2025   Nutrition   Three or more servings of calcium each day? Yes   Diet: " Regular (no restrictions)   How many servings of fruit and vegetables per day? (!) 2-3   How many sweetened beverages each day? 0-1         7/14/2025   Exercise   Days per week of moderate/strenous exercise 5 days   Average minutes spent exercising at this level 40 min         7/14/2025   Social Factors   Frequency of gathering with friends or relatives Once a week   Worry food won't last until get money to buy more No   Food not last or not have enough money for food? No   Do you have housing? (Housing is defined as stable permanent housing and does not include staying outside in a car, in a tent, in an abandoned building, in an overnight shelter, or couch-surfing.) Yes   Are you worried about losing your housing? No   Lack of transportation? No   Unable to get utilities (heat,electricity)? No         7/14/2025   Dental   Dentist two times every year? Yes       Today's PHQ-9 Score:       7/17/2025    12:41 PM   PHQ-9 SCORE   PHQ-9 Total Score MyChart 0   PHQ-9 Total Score 0        Patient-reported         7/14/2025   Substance Use   Alcohol more than 3/day or more than 7/wk Not Applicable   Do you use any other substances recreationally? No     Social History     Tobacco Use    Smoking status: Former     Current packs/day: 0.00     Types: Cigarettes    Smokeless tobacco: Never   Vaping Use    Vaping status: Never Used   Substance Use Topics    Alcohol use: Yes     Comment: rare    Drug use: No          Mammogram Screening - Mammogram every 1-2 years updated in Health Maintenance based on mutual decision making        7/14/2025   STI Screening   New sexual partner(s) since last STI/HIV test? No     History of abnormal Pap smear: No - age 21 - 65 - Patient with other risk factor requiring more frequent screening - see link Cervical Cytology Screening Guidelines        Latest Ref Rng & Units 1/11/2022     1:34 PM 11/10/2017    12:00 AM   PAP / HPV   PAP  Atypical squamous cells of undetermined significance (ASC-US)      HPV 16 DNA Negative Negative     HPV 18 DNA Negative Negative     Other HR HPV Negative Negative     PAP-ABSTRACT   See Scanned Document      ASCVD Risk   The 10-year ASCVD risk score (Davey CASTILLO, et al., 2019) is: 0.5%    Values used to calculate the score:      Age: 40 years      Sex: Female      Is Non- : No      Diabetic: No      Tobacco smoker: No      Systolic Blood Pressure: 118 mmHg      Is BP treated: No      HDL Cholesterol: 58 mg/dL      Total Cholesterol: 213 mg/dL        7/14/2025   Contraception/Family Planning   Questions about contraception or family planning No   What are your periods like? Not currently having periods        Reviewed and updated as needed this visit by Provider                    BP Readings from Last 3 Encounters:   07/17/25 118/70   07/09/24 134/82   06/03/24 130/86    Wt Readings from Last 3 Encounters:   07/17/25 84.4 kg (186 lb)   07/09/24 91.6 kg (202 lb)   06/03/24 91.2 kg (201 lb)                  Patient Active Problem List   Diagnosis    ADHD (attention deficit hyperactivity disorder), combined type    Attention deficit hyperactivity disorder (ADHD), predominantly inattentive type    Major depressive disorder, recurrent episode, moderate (H)    ASCUS of cervix with negative high risk HPV    Encounter for insertion of Mirena IUD     No past surgical history on file.    Social History     Tobacco Use    Smoking status: Former     Current packs/day: 0.00     Types: Cigarettes    Smokeless tobacco: Never   Substance Use Topics    Alcohol use: Yes     Comment: rare     Family History   Problem Relation Age of Onset    Eye Disorder Maternal Grandfather     Cancer Maternal Grandfather          Current Outpatient Medications   Medication Sig Dispense Refill    amphetamine-dextroamphetamine (ADDERALL XR) 10 MG 24 hr capsule Take 1 capsule (10 mg) by mouth daily. 15 capsule 0    amphetamine-dextroamphetamine (ADDERALL XR) 20 MG 24 hr capsule Take 1  capsule (20 mg) by mouth daily. 15 capsule 0    amphetamine-dextroamphetamine (ADDERALL XR) 20 MG 24 hr capsule Take 1 capsule (20 mg) by mouth daily. 30 capsule 0    [START ON 8/2/2025] amphetamine-dextroamphetamine (ADDERALL) 10 MG tablet Take 1 tablet (10 mg) by mouth daily. 30 tablet 0    [START ON 9/2/2025] amphetamine-dextroamphetamine (ADDERALL) 10 MG tablet Take 1 tablet (10 mg) by mouth daily. 30 tablet 0    [START ON 10/2/2025] amphetamine-dextroamphetamine (ADDERALL) 10 MG tablet Take 1 tablet (10 mg) by mouth daily. 30 tablet 0    amphetamine-dextroamphetamine (ADDERALL) 10 MG tablet Take 1 tablet (10 mg) by mouth daily. 30 tablet 0    [START ON 8/2/2025] amphetamine-dextroamphetamine (ADDERALL) 20 MG tablet Take 1 tablet (20 mg) by mouth daily. 30 tablet 0    [START ON 9/2/2025] amphetamine-dextroamphetamine (ADDERALL) 20 MG tablet Take 1 tablet (20 mg) by mouth daily. 30 tablet 0    [START ON 10/2/2025] amphetamine-dextroamphetamine (ADDERALL) 20 MG tablet Take 1 tablet (20 mg) by mouth daily. 30 tablet 0    gabapentin (NEURONTIN) 300 MG capsule Take 1 capsule (300 mg) by mouth at bedtime. 90 capsule 3    levonorgestrel (MIRENA) 20 MCG/24HR IUD 1 each (20 mcg) by Intrauterine route continuous      methocarbamol (ROBAXIN) 500 MG tablet Take 1 tablet (500 mg) by mouth 2 times daily as needed for muscle spasms. 60 tablet 4    traZODone (DESYREL) 150 MG tablet Take 1 and 1/2 tablets (total 225 mg) at bedtime  tablet 3    triamcinolone (KENALOG) 0.1 % external cream Apply topically 2 times daily 30 g 0     No Known Allergies  Recent Labs   Lab Test 07/17/25  1329 07/09/24  1329 07/07/23  0742   A1C 5.2  --   --    * 138* 142*   HDL 46* 58 54   TRIG 62 83 89   ALT 17 17 18   CR 0.62 0.62 0.71   GFRESTIMATED >90 >90 >90   POTASSIUM 4.0 4.8 4.6   TSH 2.28  --  1.25          Review of Systems  Constitutional, HEENT, cardiovascular, pulmonary, gi and gu systems are negative, except as otherwise  "noted.     Objective    Exam  /70   Pulse 92   Resp 20   Ht 1.626 m (5' 4\")   Wt 84.4 kg (186 lb)   LMP 04/01/2018   SpO2 99%   BMI 31.93 kg/m     Estimated body mass index is 31.93 kg/m  as calculated from the following:    Height as of this encounter: 1.626 m (5' 4\").    Weight as of this encounter: 84.4 kg (186 lb).    Physical Exam  GENERAL: alert and no distress  EYES: Eyes grossly normal to inspection, PERRL and conjunctivae and sclerae normal  HENT: ear canals and TM's normal, nose and mouth without ulcers or lesions  NECK: no adenopathy, no asymmetry, masses, or scars  RESP: lungs clear to auscultation - no rales, rhonchi or wheezes  CV: regular rate and rhythm, normal S1 S2, no S3 or S4, no murmur, click or rub, no peripheral edema  ABDOMEN: soft, nontender, no hepatosplenomegaly, no masses and bowel sounds normal  MS: no gross musculoskeletal defects noted, no edema  SKIN: no suspicious lesions or rashes  NEURO: Normal strength and tone, mentation intact and speech normal  PSYCH: mentation appears normal, affect normal/bright        Signed Electronically by: ALEENA Bueno CNP    Answers submitted by the patient for this visit:  Patient Health Questionnaire (Submitted on 7/17/2025)  PHQ9 TOTAL SCORE: 0    "

## 2025-07-18 LAB
HPV HR 12 DNA CVX QL NAA+PROBE: NEGATIVE
HPV16 DNA CVX QL NAA+PROBE: NEGATIVE
HPV18 DNA CVX QL NAA+PROBE: NEGATIVE
HUMAN PAPILLOMA VIRUS FINAL DIAGNOSIS: NORMAL

## 2025-07-23 LAB
BKR AP ASSOCIATED HPV REPORT: NORMAL
BKR LAB AP GYN ADEQUACY: NORMAL
BKR LAB AP GYN INTERPRETATION: NORMAL
BKR LAB AP LMP: NORMAL
BKR LAB AP PREVIOUS ABNORMAL: NORMAL
PATH REPORT.COMMENTS IMP SPEC: NORMAL
PATH REPORT.COMMENTS IMP SPEC: NORMAL
PATH REPORT.RELEVANT HX SPEC: NORMAL